# Patient Record
Sex: FEMALE | Race: WHITE | NOT HISPANIC OR LATINO | Employment: FULL TIME | URBAN - NONMETROPOLITAN AREA
[De-identification: names, ages, dates, MRNs, and addresses within clinical notes are randomized per-mention and may not be internally consistent; named-entity substitution may affect disease eponyms.]

---

## 2022-07-01 ENCOUNTER — OFFICE VISIT (OUTPATIENT)
Dept: FAMILY MEDICINE CLINIC | Facility: CLINIC | Age: 27
End: 2022-07-01

## 2022-07-01 VITALS
DIASTOLIC BLOOD PRESSURE: 90 MMHG | BODY MASS INDEX: 45.96 KG/M2 | SYSTOLIC BLOOD PRESSURE: 112 MMHG | HEART RATE: 92 BPM | OXYGEN SATURATION: 98 % | HEIGHT: 59 IN | WEIGHT: 228 LBS

## 2022-07-01 DIAGNOSIS — H02.539 LID LAG: ICD-10-CM

## 2022-07-01 DIAGNOSIS — G43.009 MIGRAINE WITHOUT AURA AND WITHOUT STATUS MIGRAINOSUS, NOT INTRACTABLE: Primary | ICD-10-CM

## 2022-07-01 PROCEDURE — 99214 OFFICE O/P EST MOD 30 MIN: CPT | Performed by: PHYSICIAN ASSISTANT

## 2022-07-01 RX ORDER — RIZATRIPTAN BENZOATE 10 MG/1
10 TABLET ORAL
COMMUNITY
End: 2022-07-01 | Stop reason: SDUPTHER

## 2022-07-01 RX ORDER — HYDROXYZINE HYDROCHLORIDE 25 MG/1
12.5-25 TABLET, FILM COATED ORAL
COMMUNITY
End: 2022-07-01

## 2022-07-01 RX ORDER — PROPRANOLOL HCL 60 MG
60 CAPSULE, EXTENDED RELEASE 24HR ORAL DAILY
Qty: 30 CAPSULE | Refills: 1 | Status: SHIPPED | OUTPATIENT
Start: 2022-07-01 | End: 2022-08-12 | Stop reason: SDUPTHER

## 2022-07-01 RX ORDER — RIZATRIPTAN BENZOATE 10 MG/1
10 TABLET ORAL ONCE AS NEEDED
Qty: 12 TABLET | Refills: 10 | Status: SHIPPED | OUTPATIENT
Start: 2022-07-01 | End: 2023-01-04

## 2022-07-01 RX ORDER — CETIRIZINE HYDROCHLORIDE 10 MG/1
10 TABLET ORAL DAILY
COMMUNITY
End: 2022-08-12 | Stop reason: SDUPTHER

## 2022-07-01 RX ORDER — FAMOTIDINE 20 MG/1
20 TABLET, FILM COATED ORAL
COMMUNITY
End: 2022-08-12 | Stop reason: SDUPTHER

## 2022-07-01 NOTE — PROGRESS NOTES
"Chief Complaint  Migraine (Patient reports that she has a hx on migraines and would like to discuss. She states that she has lightheaded and dizziness )    Subjective        Nena Merida presents to [facility]  Patient reports today secondary to needing a work-up.  Patient reports she has been having increased migraines for the past 6 to 7 months.  Patient states she discontinued her birth control around 8 months ago.  Patient states she has been having 2-3 migraines a month.  Reports 5 weeks ago she had a migraine and afterwards she has began to have lid lag of the left eyelid.  Patient reports it is usual for her to have blurred vision with a migraine however she has never had lid lag.  Patient denies any recent eye exam.  Patient denies any weakness or difficulty speaking or other signs of stroke.    Migraine      Objective   Vital Signs:  /90 (BP Location: Left arm, Patient Position: Sitting, Cuff Size: Adult)   Pulse 92   Ht 149.9 cm (59\")   Wt 103 kg (228 lb)   SpO2 98%   BMI 46.05 kg/m²   Estimated body mass index is 46.05 kg/m² as calculated from the following:    Height as of this encounter: 149.9 cm (59\").    Weight as of this encounter: 103 kg (228 lb).          Physical Exam  Vitals and nursing note reviewed.   Constitutional:       General: She is not in acute distress.     Appearance: Normal appearance.   HENT:      Head: Normocephalic.      Right Ear: Hearing normal.      Left Ear: Hearing normal.   Eyes:      Pupils: Pupils are equal, round, and reactive to light.      Comments: Lid lag left upper eye lid   Cardiovascular:      Rate and Rhythm: Normal rate and regular rhythm.   Pulmonary:      Effort: Pulmonary effort is normal. No respiratory distress.   Skin:     General: Skin is warm and dry.   Neurological:      Mental Status: She is alert.   Psychiatric:         Mood and Affect: Mood normal.        Result Review :                Assessment and Plan "   Diagnoses and all orders for this visit:    1. Migraine without aura and without status migrainosus, not intractable (Primary)  Assessment & Plan:  Headaches are worsening and likely secondary to patient discs continue on birth control 8 months ago.  Patient will start propanolol for possible migraine prophylaxis.  Patient will continue rizatriptan.  Call if any problems arise and return to clinic in 4 to 6 weeks for follow-up.  Will refer patient to neurology as requested    Orders:  -     rizatriptan (MAXALT) 10 MG tablet; Take 1 tablet by mouth 1 (One) Time As Needed for Migraine for up to 1 dose.  Dispense: 12 tablet; Refill: 10  -     propranolol LA (Inderal LA) 60 MG 24 hr capsule; Take 1 capsule by mouth Daily. For migraine prevention  Dispense: 30 capsule; Refill: 1  -     Ambulatory Referral to Neurology  -     MRI Brain Without Contrast; Future    2. Lid lag  Assessment & Plan:  Advised patient to make an appointment with ophthalmologist for further work-up.  We will schedule patient for MRI.    Orders:  -     MRI Brain Without Contrast; Future           Follow Up   No follow-ups on file.  Patient was given instructions and counseling regarding her condition or for health maintenance advice. Please see specific information pulled into the AVS if appropriate.

## 2022-07-01 NOTE — ASSESSMENT & PLAN NOTE
Advised patient to make an appointment with ophthalmologist for further work-up.  We will schedule patient for MRI.

## 2022-08-12 ENCOUNTER — OFFICE VISIT (OUTPATIENT)
Dept: FAMILY MEDICINE CLINIC | Facility: CLINIC | Age: 27
End: 2022-08-12

## 2022-08-12 VITALS
WEIGHT: 230 LBS | HEIGHT: 59 IN | DIASTOLIC BLOOD PRESSURE: 80 MMHG | SYSTOLIC BLOOD PRESSURE: 128 MMHG | BODY MASS INDEX: 46.37 KG/M2

## 2022-08-12 DIAGNOSIS — Z00.00 GENERAL MEDICAL EXAM: Primary | ICD-10-CM

## 2022-08-12 DIAGNOSIS — J30.1 SEASONAL ALLERGIC RHINITIS DUE TO POLLEN: ICD-10-CM

## 2022-08-12 DIAGNOSIS — G43.009 MIGRAINE WITHOUT AURA AND WITHOUT STATUS MIGRAINOSUS, NOT INTRACTABLE: ICD-10-CM

## 2022-08-12 DIAGNOSIS — K21.9 GERD WITHOUT ESOPHAGITIS: ICD-10-CM

## 2022-08-12 DIAGNOSIS — G43.709 CHRONIC MIGRAINE WITHOUT AURA WITHOUT STATUS MIGRAINOSUS, NOT INTRACTABLE: ICD-10-CM

## 2022-08-12 PROCEDURE — 99395 PREV VISIT EST AGE 18-39: CPT | Performed by: FAMILY MEDICINE

## 2022-08-12 RX ORDER — CETIRIZINE HYDROCHLORIDE 10 MG/1
10 TABLET ORAL DAILY
Qty: 90 TABLET | Refills: 1 | Status: SHIPPED | OUTPATIENT
Start: 2022-08-12

## 2022-08-12 RX ORDER — PROPRANOLOL HCL 60 MG
60 CAPSULE, EXTENDED RELEASE 24HR ORAL DAILY
Qty: 90 CAPSULE | Refills: 1 | Status: SHIPPED | OUTPATIENT
Start: 2022-08-12 | End: 2023-01-04

## 2022-08-12 RX ORDER — FAMOTIDINE 20 MG/1
20 TABLET, FILM COATED ORAL 2 TIMES DAILY
Qty: 180 TABLET | Refills: 1 | Status: SHIPPED | OUTPATIENT
Start: 2022-08-12 | End: 2023-02-08

## 2022-08-12 NOTE — PROGRESS NOTES
"Chief Complaint  Migraine (Follow-up on propranolol.) and Annual Exam    Subjective    History of Present Illness:  Nena Merida is a 27 y.o. female who presents today for checkup visit and to follow-up on recent treatment with Inderal LA for migraine prevention.    Doing great with low-dose Inderal LA.  Using Maxalt infrequently.    Allergies stable with Zyrtec and Flonase as needed.  We did discuss she can add in Zaditor drops over-the-counter    Last fasting blood work was in 2020 and she will consider getting that up-to-date but wants to hold off at this time.    Due for COVID booster and plans to get that up-to-date soon.    Pap smear is past due and she is scheduled in November with gynecology to get this up-to-date        Objective   Vital Signs:   /80 (BP Location: Left arm, Patient Position: Sitting, Cuff Size: Large Adult)   Ht 149.9 cm (59\")   Wt 104 kg (230 lb)   BMI 46.45 kg/m²     Review of Systems   Constitutional: Negative for appetite change, chills and fever.   HENT: Negative for hearing loss.    Eyes: Negative for blurred vision.   Respiratory: Negative for chest tightness.    Cardiovascular: Negative for chest pain.   Gastrointestinal: Negative for abdominal pain.   Musculoskeletal: Negative for gait problem.   Skin: Negative for rash.   Psychiatric/Behavioral: Negative for depressed mood.       Past History:  Medical History: has a past medical history of Anxiety, Common migraine, and Esophageal reflux.   Surgical History: has a past surgical history that includes [place] tooth extraction.   Family History: family history includes Cancer in her mother; Diabetes in an other family member; Hypertension in her father, mother, and another family member; Migraines in her mother, sister, and another family member.   Social History: reports that she has never smoked. She has never used smokeless tobacco. Drug use questions deferred to the physician. She reports that she does not drink " alcohol.      Current Outpatient Medications:   •  cetirizine (zyrTEC) 10 MG tablet, Take 1 tablet by mouth Daily., Disp: 90 tablet, Rfl: 1  •  famotidine (PEPCID) 20 MG tablet, Take 1 tablet by mouth 2 (Two) Times a Day., Disp: 180 tablet, Rfl: 1  •  propranolol LA (Inderal LA) 60 MG 24 hr capsule, Take 1 capsule by mouth Daily. For migraine prevention, Disp: 90 capsule, Rfl: 1  •  rizatriptan (MAXALT) 10 MG tablet, Take 1 tablet by mouth 1 (One) Time As Needed for Migraine for up to 1 dose., Disp: 12 tablet, Rfl: 10    Allergies: Sulfa antibiotics    Physical Exam  Constitutional:       Appearance: She is obese.   HENT:      Head: Normocephalic.      Right Ear: External ear normal.      Left Ear: External ear normal.      Nose: Nose normal.   Eyes:      Pupils: Pupils are equal, round, and reactive to light.   Cardiovascular:      Rate and Rhythm: Normal rate and regular rhythm.      Heart sounds: Normal heart sounds.   Pulmonary:      Effort: Pulmonary effort is normal.      Breath sounds: Normal breath sounds.   Musculoskeletal:         General: Normal range of motion.      Cervical back: Normal range of motion.   Skin:     General: Skin is warm and dry.   Neurological:      General: No focal deficit present.      Mental Status: She is alert.   Psychiatric:         Mood and Affect: Mood normal.         Behavior: Behavior normal.         Thought Content: Thought content normal.          Result Review                   Assessment and Plan  Diagnoses and all orders for this visit:    1. General medical exam (Primary)  Assessment & Plan:  Reviewed health maintenance and screening along with vaccinations.    Pap smear scheduled in November.    Encouraged COVID booster.    Encourage consideration for repeat fasting labs      2. Seasonal allergic rhinitis due to pollen  Assessment & Plan:  Continue with Zyrtec and Flonase    Orders:  -     cetirizine (zyrTEC) 10 MG tablet; Take 1 tablet by mouth Daily.  Dispense: 90  tablet; Refill: 1    3. Chronic migraine without aura without status migrainosus, not intractable  Assessment & Plan:  Headaches are improving with treatment.  Continue current treatment regimen.          4. Migraine without aura and without status migrainosus, not intractable  -     propranolol LA (Inderal LA) 60 MG 24 hr capsule; Take 1 capsule by mouth Daily. For migraine prevention  Dispense: 90 capsule; Refill: 1    5. GERD without esophagitis  Assessment & Plan:  Controlled with Pepcid    Orders:  -     famotidine (PEPCID) 20 MG tablet; Take 1 tablet by mouth 2 (Two) Times a Day.  Dispense: 180 tablet; Refill: 1      Class 3 Severe Obesity (BMI >=40). Obesity-related health conditions include the following: GERD. Obesity is improving with treatment. BMI is is above average; BMI management plan is completed. We discussed portion control and increasing exercise.          Follow Up  Return in about 6 months (around 2/12/2023) for Med recheck.    Jose Angel Ortiz MD

## 2022-08-12 NOTE — ASSESSMENT & PLAN NOTE
Reviewed health maintenance and screening along with vaccinations.    Pap smear scheduled in November.    Encouraged COVID booster.    Encourage consideration for repeat fasting labs

## 2022-09-14 ENCOUNTER — LAB (OUTPATIENT)
Dept: LAB | Facility: HOSPITAL | Age: 27
End: 2022-09-14

## 2022-09-14 ENCOUNTER — OFFICE VISIT (OUTPATIENT)
Dept: NEUROLOGY | Facility: CLINIC | Age: 27
End: 2022-09-14

## 2022-09-14 VITALS
BODY MASS INDEX: 45.96 KG/M2 | HEIGHT: 59 IN | OXYGEN SATURATION: 98 % | SYSTOLIC BLOOD PRESSURE: 132 MMHG | HEART RATE: 70 BPM | DIASTOLIC BLOOD PRESSURE: 82 MMHG | WEIGHT: 228 LBS

## 2022-09-14 DIAGNOSIS — G43.709 CHRONIC MIGRAINE WITHOUT AURA WITHOUT STATUS MIGRAINOSUS, NOT INTRACTABLE: ICD-10-CM

## 2022-09-14 DIAGNOSIS — H02.402 PTOSIS, LEFT EYELID: Primary | ICD-10-CM

## 2022-09-14 DIAGNOSIS — H53.453 DECREASED PERIPHERAL VISION OF BOTH EYES: ICD-10-CM

## 2022-09-14 DIAGNOSIS — H02.402 PTOSIS, LEFT EYELID: ICD-10-CM

## 2022-09-14 DIAGNOSIS — R40.0 DAYTIME SLEEPINESS: ICD-10-CM

## 2022-09-14 DIAGNOSIS — R06.83 SNORING: ICD-10-CM

## 2022-09-14 PROBLEM — F32.A ANXIETY AND DEPRESSION: Status: ACTIVE | Noted: 2021-12-15

## 2022-09-14 PROBLEM — F41.9 ANXIETY AND DEPRESSION: Status: ACTIVE | Noted: 2021-12-15

## 2022-09-14 PROBLEM — J30.2 SEASONAL ALLERGIES: Status: ACTIVE | Noted: 2019-11-06

## 2022-09-14 PROBLEM — G43.909 MIGRAINES: Status: ACTIVE | Noted: 2021-12-15

## 2022-09-14 LAB
ALBUMIN SERPL-MCNC: 4.5 G/DL (ref 3.5–5.2)
ALBUMIN/GLOB SERPL: 1.7 G/DL
ALP SERPL-CCNC: 68 U/L (ref 39–117)
ALT SERPL W P-5'-P-CCNC: 63 U/L (ref 1–33)
ANION GAP SERPL CALCULATED.3IONS-SCNC: 12.7 MMOL/L (ref 5–15)
AST SERPL-CCNC: 31 U/L (ref 1–32)
BILIRUB SERPL-MCNC: 0.3 MG/DL (ref 0–1.2)
BUN SERPL-MCNC: 12 MG/DL (ref 6–20)
BUN/CREAT SERPL: 12.5 (ref 7–25)
CALCIUM SPEC-SCNC: 9.8 MG/DL (ref 8.6–10.5)
CHLORIDE SERPL-SCNC: 103 MMOL/L (ref 98–107)
CO2 SERPL-SCNC: 24.3 MMOL/L (ref 22–29)
CREAT SERPL-MCNC: 0.96 MG/DL (ref 0.57–1)
EGFRCR SERPLBLD CKD-EPI 2021: 82.8 ML/MIN/1.73
GLOBULIN UR ELPH-MCNC: 2.7 GM/DL
GLUCOSE SERPL-MCNC: 89 MG/DL (ref 65–99)
POTASSIUM SERPL-SCNC: 4.3 MMOL/L (ref 3.5–5.2)
PROT SERPL-MCNC: 7.2 G/DL (ref 6–8.5)
SODIUM SERPL-SCNC: 140 MMOL/L (ref 136–145)
T4 FREE SERPL-MCNC: 1.28 NG/DL (ref 0.93–1.7)
TSH SERPL DL<=0.05 MIU/L-ACNC: 1.59 UIU/ML (ref 0.27–4.2)

## 2022-09-14 PROCEDURE — 99204 OFFICE O/P NEW MOD 45 MIN: CPT | Performed by: NURSE PRACTITIONER

## 2022-09-14 PROCEDURE — 83519 RIA NONANTIBODY: CPT

## 2022-09-14 PROCEDURE — 84439 ASSAY OF FREE THYROXINE: CPT

## 2022-09-14 PROCEDURE — 80050 GENERAL HEALTH PANEL: CPT

## 2022-09-14 PROCEDURE — 36415 COLL VENOUS BLD VENIPUNCTURE: CPT

## 2022-09-14 RX ORDER — PRENATAL VIT NO.126/IRON/FOLIC 28MG-0.8MG
1 TABLET ORAL AS NEEDED
COMMUNITY

## 2022-09-14 NOTE — PROGRESS NOTES
Subjective:     Patient ID: Nena Merida is a 27 y.o. female.    CC:   Chief Complaint   Patient presents with   • Migraine       HPI:   History of Present Illness   Today 9/14/2022-  This is a very pleasant 27-year-old female who presents to establish care with neurology for migraine headaches, which started in 2005. She feels that her symptoms are worsening. Triggers have been stress, anxiety, quiet after loud noises, sometimes heat and coolness have helped, medications, and sleeping. She has found that lights, noise, anxiety, and stress worsen her symptoms at times. She has had some slurred speech and drooping of her left eyelid associated with severe migraines. She has not necessarily noticed any pattern in her symptoms. She has been told to take Tylenol. She is currently on propranolol LA 60 mg daily for migraine prevention and has been prescribed rizatriptan to take at onset of migraines.    Today, the patient reports a family history of migraines in her mother, sister, and grandmother.    She started taking propranolol LA 60 mg daily approximately 8 weeks ago, and she feels this has helped for the most part. The migraines have not been as severe since starting this medication. She denies any side effects from the propranolol currently, but it did take some adjusting while starting due to some tiredness.    The patient is prescribed rizatriptan to take as needed, and she states this is helpful. She occasionally has to take the second dose. Over the past 6 months, she has had 16-17 headache days per month with 9 days being migraines. Her migraines typically last approximately 3 days, and she has 3 per month. Her other headaches are in addition to the migraines. When she has a migraine, the pain is located in the neck, and sometimes it is more on the left side & can be across her forehead starting with the left side and moving to the right side. She describes the pain as throbbing and moderate to severe. She  "has nausea, vomiting, photophobia, and phonophobia. The patient reports 1 episode of blurry vision and disorientation in 04/2022 prior to the worst migraine she has ever had. She has not had a dilated eye exam in years, but she did see her optometrist, who diagnosed her with ptosis of the left eyelid & did take detailed pictures of the back of her eye and did not see swelling or other problems. The ptosis is intermittent, and it worsens with headaches. She denies any numbness, tingling, or weakness. She denies any double vision, shortness of breath, dysphagia, or weakness in the upper extremities. The patient denies any difficulty moving the eyes.    The patient reports that the worst migraine she has ever had was in early 05/2022, which lasted 4 days. She did not go to the hospital. She continued working through this migraine. She has not had any imaging of the brain. The patient reports that she is claustrophobic, but she has never had an MRI. The patient states she was on a migraine medication that she had to lay down after taking. She took it as needed. It was likely sumatriptan. She did not like the way this made her feel.    The patient denies any neck pain. She reports that she sleeps \" funny \" at times. Her  has told her that she snores a little, but it is not heavy. She has not had a sleep study. The patient reports that she is usually pretty sleepy when she wakes up in the morning. It does not interfere with her day. She does not have any problems going to sleep.     The patient is planning to have children within the next year. She is trying to lose weight first.    She states that she loses her blalance when she is in the shower washing her hair. The patient does not feel dizzy when this happens.     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    The following portions of the patient's history were reviewed and updated as appropriate: allergies, current medications, past family history, past medical history, past social history, past surgical history and problem list.    Past Medical History:   Diagnosis Date   • Anxiety    • Cluster headache 2005   • Common migraine    • Depression    • Esophageal reflux    • Headache, tension-type 2005       Past Surgical History:   Procedure Laterality Date   • [place]IS[place] TOOTH EXTRACTION         Social History     Socioeconomic History   • Marital status:    Tobacco Use   • Smoking status: Never Smoker   • Smokeless tobacco: Never Used   Vaping Use   • Vaping Use: Never used   Substance and Sexual Activity   • Alcohol use: Yes     Alcohol/week: 2.0 - 3.0 standard drinks     Types: 2 - 3 Drinks containing 0.5 oz of alcohol per week   • Drug use: Never   • Sexual activity: Yes     Partners: Male     Comment: Last BC pull taken Oct 2021       Family History   Problem Relation Age of Onset   • Migraines Mother    • Cancer Mother    • Hypertension Mother    • Hypertension Father    • Migraines Father    • Migraines Sister    • Diabetes Other    • Hypertension Other    • Migraines Other           Current Outpatient Medications:   •  cetirizine (zyrTEC) 10 MG tablet, Take 1 tablet by mouth Daily., Disp: 90 tablet, Rfl: 1  •  famotidine (PEPCID) 20 MG tablet, Take 1 tablet by mouth 2 (Two) Times a Day., Disp: 180 tablet, Rfl: 1  •  prenatal vitamin (prenatal, CLASSIC, vitamin) tablet, Take 1 tablet by mouth As Needed., Disp: , Rfl:   •  propranolol LA (Inderal LA) 60 MG 24 hr capsule, Take 1 capsule by mouth Daily. For migraine prevention, Disp: 90 capsule, Rfl: 1  •  rizatriptan (MAXALT) 10 MG tablet, Take 1 tablet by mouth 1 (One) Time As Needed for Migraine for up to 1 dose., Disp: 12 tablet, Rfl: 10     Review of Systems   Constitutional: Negative for chills, fatigue, fever and  "unexpected weight change.   HENT: Negative for ear pain, hearing loss, nosebleeds, rhinorrhea and sore throat.    Eyes: Positive for visual disturbance. Negative for photophobia, pain, discharge and itching.   Respiratory: Negative for cough, chest tightness, shortness of breath and wheezing.    Cardiovascular: Negative for chest pain, palpitations and leg swelling.   Gastrointestinal: Positive for nausea and vomiting. Negative for abdominal pain, blood in stool, constipation and diarrhea.   Genitourinary: Negative for dysuria, frequency, hematuria and urgency.   Musculoskeletal: Negative for arthralgias, back pain, gait problem, joint swelling, myalgias, neck pain and neck stiffness.   Skin: Negative for rash and wound.   Allergic/Immunologic: Negative for environmental allergies and food allergies.   Neurological: Positive for headaches. Negative for dizziness, tremors, seizures, syncope, speech difficulty, weakness, light-headedness and numbness.   Hematological: Negative for adenopathy. Does not bruise/bleed easily.   Psychiatric/Behavioral: Positive for dysphoric mood. Negative for agitation, confusion, decreased concentration, hallucinations, sleep disturbance and suicidal ideas. The patient is nervous/anxious.    All other systems reviewed and are negative.       Objective:  /82   Pulse 70   Ht 149.9 cm (59\")   Wt 103 kg (228 lb)   SpO2 98%   BMI 46.05 kg/m²     Neurologic Exam     Mental Status   Oriented to person, place, and time.   Attention: normal. Concentration: normal.   Speech: speech is normal   Level of consciousness: alert  Knowledge: good.   Normal comprehension.     Cranial Nerves     CN II   Visual acuity: decreased  Right visual field deficit: upper temporal (slight decreased peripheral vision equal and bilateral) quadrant(s)  Left visual field deficit: upper temporal quadrant(s)    CN III, IV, VI   Pupils are equal, round, and reactive to light.  Extraocular motions are normal. "   CN III: no CN III palsy  CN VI: no CN VI palsy  Nystagmus: none   Diplopia: none  Ophthalmoparesis: none  Upgaze: normal  Downgaze: normal    CN V   Facial sensation intact.     CN VII   Facial expression full, symmetric.   Left facial weakness: left eyelid ptosis marked, otherwise face symmetric.    CN VIII   CN VIII normal.     CN IX, X   CN IX normal.   CN X normal.     CN XI   CN XI normal.     CN XII   CN XII normal.     Motor Exam   Muscle bulk: normal  Overall muscle tone: normal    Strength   Strength 5/5 throughout.     Sensory Exam   Light touch normal.   Vibration normal.   Proprioception normal.   Pinprick normal.     Gait, Coordination, and Reflexes     Gait  Gait: normal    Coordination   Romberg: negative  Finger to nose coordination: normal  Heel to shin coordination: normal  Tandem walking coordination: normal    Tremor   Resting tremor: absent  Intention tremor: absent  Action tremor: absent    Reflexes   Reflexes 2+ except as noted.   Right : 2+  Left : 2+  Right plantar: normal  Left plantar: normal  Right ankle clonus: absent  Left ankle clonus: absent      Physical Exam  Constitutional:       Appearance: Normal appearance. She is obese.      Comments: BMI 46   Eyes:      Extraocular Movements: Extraocular movements intact and EOM normal.      Conjunctiva/sclera: Conjunctivae normal.      Pupils: Pupils are equal, round, and reactive to light.      Right eye: Pupil is not sluggish.      Left eye: Pupil is not sluggish.      Funduscopic exam:     Right eye: Red reflex present.         Left eye: Red reflex present.    Cardiovascular:      Rate and Rhythm: Normal rate and regular rhythm.      Heart sounds: Normal heart sounds, S1 normal and S2 normal.   Musculoskeletal:      Cervical back: Normal range of motion.   Neurological:      Mental Status: She is alert and oriented to person, place, and time.      Coordination: Finger-Nose-Finger Test, Heel to Shin Test and Romberg Test normal.       Gait: Gait is intact. Tandem walk normal.      Deep Tendon Reflexes: Strength normal.   Psychiatric:         Mood and Affect: Mood and affect normal.         Speech: Speech normal.         Behavior: Behavior normal.         Thought Content: Thought content normal.         Cognition and Memory: Cognition and memory normal.         Judgment: Judgment normal.         Assessment/Plan:       Diagnoses and all orders for this visit:    1. Ptosis, left eyelid (Primary)  -     MRI Brain With & Without Contrast; Future  -     Musk Antibody; Future  -     Acetylcholine Receptor Antibody Panel; Future  -     Acetylcholine Receptor, Binding; Future  -     Acetylcholine Receptor, Blocking; Future  -     Ambulatory Referral to Ophthalmology    2. Chronic migraine without aura without status migrainosus, not intractable  -     MRI Brain With & Without Contrast; Future  -     TSH; Future  -     T4, free; Future  -     Comprehensive Metabolic Panel; Future  -     CBC & Differential; Future    3. Snoring  -     Ambulatory Referral to Sleep Medicine    4. Daytime sleepiness  -     Ambulatory Referral to Sleep Medicine    5. Decreased peripheral vision of both eyes  -     Ambulatory Referral to Ophthalmology           She currently has chronic migraines. Previously tried Imitrex and over the counter medications without improvement. For now, she is going to continue the propranolol LA at 60 mg daily as initially this made her quite tired, but she has finally adjusted. The rizatriptan is working well for her to take as needed for her migraines, and they seem to be improving over the last 8 weeks.    She does have significant ptosis and some decreased peripheral vision on exam. I am referring her to ophthalmology for further evaluation. She is going to upload her optometrist evaluation. I do recommend visual field testing, additional labs to rule out any chance of myasthenia gravis or ocular myasthenia.    She is also going to start  magnesium oxide 400 mg a day. We did discuss safety and monitoring of medications if she were to become pregnant. She will call us with any additional questions or concerns prior to follow-up.    She is going to follow up in 4 months for reevaluation of migraines or sooner if needed.    Reviewed medications, potential side effects and signs and symptoms to report. Discussed risk versus benefits of treatment plan with patient and/or family-including medications, labs and radiology that may be ordered. Addressed questions and concerns during visit. Patient and/or family verbalized understanding and agree with plan.    AS THE PROVIDER, I PERSONALLY WORE PPE DURING ENTIRE FACE TO FACE ENCOUNTER IN CLINIC WITH THE PATIENT. PATIENT ALSO WORE PPE DURING ENTIRE FACE TO FACE ENCOUNTER EXCEPT FOR A MAX OF 30 SECONDS DURING NEUROLOGICAL EVALUATION OF CRANIAL NERVES AND THEN MASK WAS PLACED BACK OVER PATIENT FACE FOR REMAINDER OF VISIT. I WASHED MY HANDS BEFORE AND AFTER VISIT.    During this visit the following were done:  Labs Reviewed [x]    Labs Ordered [x]    Radiology Reports Reviewed []    Radiology Ordered [x]    PCP Records Reviewed [x]    Referring Provider Records Reviewed [x]    ER Records Reviewed []    Hospital Records Reviewed []    History Obtained From Family []    Radiology Images Reviewed []    Other Reviewed [x]    Records Requested []      Transcribed from ambient dictation for MARQUEZ Obando by HEATH WHARTON.  09/14/22   17:34 EDT    Patient verbalized consent to the visit recording.  I have personally performed the services described in this document as transcribed by the above individual, and it is both accurate and complete.  MARQUEZ Obando  9/14/2022  21:47 EDT

## 2022-09-15 ENCOUNTER — TELEPHONE (OUTPATIENT)
Dept: NEUROLOGY | Facility: CLINIC | Age: 27
End: 2022-09-15

## 2022-09-15 LAB
BASOPHILS # BLD AUTO: 0.04 10*3/MM3 (ref 0–0.2)
BASOPHILS NFR BLD AUTO: 0.5 % (ref 0–1.5)
DEPRECATED RDW RBC AUTO: 40.2 FL (ref 37–54)
EOSINOPHIL # BLD AUTO: 0.07 10*3/MM3 (ref 0–0.4)
EOSINOPHIL NFR BLD AUTO: 0.9 % (ref 0.3–6.2)
ERYTHROCYTE [DISTWIDTH] IN BLOOD BY AUTOMATED COUNT: 12.3 % (ref 12.3–15.4)
HCT VFR BLD AUTO: 39.5 % (ref 34–46.6)
HGB BLD-MCNC: 13.6 G/DL (ref 12–15.9)
IMM GRANULOCYTES # BLD AUTO: 0.02 10*3/MM3 (ref 0–0.05)
IMM GRANULOCYTES NFR BLD AUTO: 0.3 % (ref 0–0.5)
LYMPHOCYTES # BLD AUTO: 1.97 10*3/MM3 (ref 0.7–3.1)
LYMPHOCYTES NFR BLD AUTO: 25.8 % (ref 19.6–45.3)
MCH RBC QN AUTO: 30.3 PG (ref 26.6–33)
MCHC RBC AUTO-ENTMCNC: 34.4 G/DL (ref 31.5–35.7)
MCV RBC AUTO: 88 FL (ref 79–97)
MONOCYTES # BLD AUTO: 0.48 10*3/MM3 (ref 0.1–0.9)
MONOCYTES NFR BLD AUTO: 6.3 % (ref 5–12)
NEUTROPHILS NFR BLD AUTO: 5.07 10*3/MM3 (ref 1.7–7)
NEUTROPHILS NFR BLD AUTO: 66.2 % (ref 42.7–76)
NRBC BLD AUTO-RTO: 0 /100 WBC (ref 0–0.2)
PLATELET # BLD AUTO: 355 10*3/MM3 (ref 140–450)
PMV BLD AUTO: 9.7 FL (ref 6–12)
RBC # BLD AUTO: 4.49 10*6/MM3 (ref 3.77–5.28)
WBC NRBC COR # BLD: 7.65 10*3/MM3 (ref 3.4–10.8)

## 2022-09-15 NOTE — TELEPHONE ENCOUNTER
----- Message from MARQUEZ Obando sent at 9/15/2022  8:30 AM EDT -----  Please notify Nena that her blood counts look excellent, her thyroid levels look perfect, her liver and kidney function overall look normal with the exception of one of her labs the ALT which has to do with liver is elevated.  I would recommend that she have her labs rechecked in 3 to 6 months with her primary care provider additional specialty labs are pending and we will find her of those results once available.  Thanks, MARQUEZ Veliz.

## 2022-09-15 NOTE — PROGRESS NOTES
Please notify Nena that her blood counts look excellent, her thyroid levels look perfect, her liver and kidney function overall look normal with the exception of one of her labs the ALT which has to do with liver is elevated.  I would recommend that she have her labs rechecked in 3 to 6 months with her primary care provider additional specialty labs are pending and we will find her of those results once available.  Thanks, MARQUEZ Veliz.

## 2022-09-20 LAB
ACHR BIND AB SER-SCNC: <0.03 NMOL/L (ref 0–0.24)
ACHR BLOCK AB SER-ACNC: 18 % (ref 0–25)
ACHR RECEPTOR MODULATING AB: 0 % (ref 0–45)

## 2022-09-21 ENCOUNTER — TELEPHONE (OUTPATIENT)
Dept: NEUROLOGY | Facility: CLINIC | Age: 27
End: 2022-09-21

## 2022-09-21 NOTE — TELEPHONE ENCOUNTER
----- Message from MARQUEZ Obando sent at 9/21/2022  8:37 AM EDT -----  Labs to evaluate for myasthenia gravis are normal and negative.,  MARQUEZ Veliz.

## 2022-09-22 ENCOUNTER — PATIENT MESSAGE (OUTPATIENT)
Dept: FAMILY MEDICINE CLINIC | Facility: CLINIC | Age: 27
End: 2022-09-22

## 2022-09-23 NOTE — TELEPHONE ENCOUNTER
From: Nena Merida  To: Jose Angel Ortiz MD  Sent: 9/22/2022 3:38 PM EDT  Subject: Request    Hey Dr. Ortiz, last time I was in I told you I had started a new job and it was working out great. Well, it turns out I am sitting A LOT. It’s required that I get a physician note to get a stand up desk for my office. Would you be able to write a note for me to get one? It’s very difficult to sit all day or to type standing up on a regular desk when I’m used to moving about all day.     Thank you!

## 2022-09-28 LAB — MUSK AB SER IA-ACNC: <1 U/ML

## 2022-10-05 ENCOUNTER — PATIENT MESSAGE (OUTPATIENT)
Dept: FAMILY MEDICINE CLINIC | Facility: CLINIC | Age: 27
End: 2022-10-05

## 2022-10-06 RX ORDER — ESCITALOPRAM OXALATE 10 MG/1
10 TABLET ORAL DAILY
Qty: 30 TABLET | Refills: 1 | Status: SHIPPED | OUTPATIENT
Start: 2022-10-06 | End: 2023-01-03

## 2022-10-06 NOTE — TELEPHONE ENCOUNTER
From: Nena Merida  To: Jose Angel Ortiz MD  Sent: 10/5/2022 2:44 PM EDT  Subject: Lexapro    Dr. Ortiz… I know I said I was feeling okay in July about not being on the Lexapro anymore. Now that it’s October, I am feeling the consequences. Would we be able to re-instate that prescription? If we need to make another appointment I can, my next one with you is in February.

## 2022-10-25 ENCOUNTER — HOSPITAL ENCOUNTER (OUTPATIENT)
Dept: MRI IMAGING | Facility: HOSPITAL | Age: 27
Discharge: HOME | End: 2022-10-25
Admitting: NURSE PRACTITIONER

## 2022-10-25 DIAGNOSIS — G43.709 CHRONIC MIGRAINE WITHOUT AURA WITHOUT STATUS MIGRAINOSUS, NOT INTRACTABLE: ICD-10-CM

## 2022-10-25 DIAGNOSIS — H02.402 PTOSIS, LEFT EYELID: ICD-10-CM

## 2022-10-25 PROCEDURE — A9577 INJ MULTIHANCE: HCPCS | Performed by: NURSE PRACTITIONER

## 2022-10-25 PROCEDURE — 70553 MRI BRAIN STEM W/O & W/DYE: CPT

## 2022-10-25 PROCEDURE — 0 GADOBENATE DIMEGLUMINE 529 MG/ML SOLUTION: Performed by: NURSE PRACTITIONER

## 2022-10-25 RX ADMIN — GADOBENATE DIMEGLUMINE 20 ML: 529 INJECTION, SOLUTION INTRAVENOUS at 15:38

## 2022-10-27 ENCOUNTER — TELEPHONE (OUTPATIENT)
Dept: NEUROLOGY | Facility: CLINIC | Age: 27
End: 2022-10-27

## 2022-10-27 NOTE — PROGRESS NOTES
Please notify Nena that the MRI of her brain with and without contrast is within normal limits.  We will follow-up as scheduled in clinic.  Thanks, MARQUEZ Veliz.

## 2022-10-27 NOTE — TELEPHONE ENCOUNTER
----- Message from MARQUEZ Obando sent at 10/27/2022 10:28 AM EDT -----  Please notify Nena that the MRI of her brain with and without contrast is within normal limits.  We will follow-up as scheduled in clinic.  Thanks, MARQUEZ Veliz.

## 2022-12-01 ENCOUNTER — TELEPHONE (OUTPATIENT)
Dept: NEUROLOGY | Facility: CLINIC | Age: 27
End: 2022-12-01

## 2022-12-01 NOTE — TELEPHONE ENCOUNTER
Caller: IVON    Relationship: W/ DR. ARROYO'S OFFICE ([facility])    Best call back number: [phone]    What form or medical record are you requesting: MRI BRAIN WWO CONTRAST COMPLETED ON 10/25/22    How would you like to receive the form or medical records (pick-up, mail, fax): FAX  If fax, what is the fax number: [phone]    Timeframe paperwork needed: ASAP    Additional notes: PT'S OPHTHALMOLOGY OFFICE HAS REQUESTED MRI RESULTS BE FAXED FOR DR. ARROYO TO REVIEW.    PLEASE REVIEW AND ADVISE.

## 2022-12-08 ENCOUNTER — PRIOR AUTHORIZATION (OUTPATIENT)
Dept: NEUROLOGY | Facility: CLINIC | Age: 27
End: 2022-12-08

## 2023-01-03 RX ORDER — ESCITALOPRAM OXALATE 10 MG/1
TABLET ORAL
Qty: 30 TABLET | Refills: 1 | Status: SHIPPED | OUTPATIENT
Start: 2023-01-03

## 2023-01-04 ENCOUNTER — OFFICE VISIT (OUTPATIENT)
Dept: NEUROLOGY | Facility: CLINIC | Age: 28
End: 2023-01-04
Payer: COMMERCIAL

## 2023-01-04 VITALS
HEART RATE: 70 BPM | SYSTOLIC BLOOD PRESSURE: 112 MMHG | DIASTOLIC BLOOD PRESSURE: 76 MMHG | BODY MASS INDEX: 43.83 KG/M2 | OXYGEN SATURATION: 99 % | WEIGHT: 217 LBS

## 2023-01-04 DIAGNOSIS — H02.402 PTOSIS, LEFT EYELID: ICD-10-CM

## 2023-01-04 DIAGNOSIS — G43.709 CHRONIC MIGRAINE WITHOUT AURA WITHOUT STATUS MIGRAINOSUS, NOT INTRACTABLE: Primary | ICD-10-CM

## 2023-01-04 PROCEDURE — 99214 OFFICE O/P EST MOD 30 MIN: CPT | Performed by: NURSE PRACTITIONER

## 2023-01-04 RX ORDER — RIZATRIPTAN BENZOATE 10 MG/1
10 TABLET ORAL ONCE AS NEEDED
Qty: 36 TABLET | Refills: 3 | Status: SHIPPED | OUTPATIENT
Start: 2023-01-04

## 2023-01-04 RX ORDER — PROPRANOLOL HCL 60 MG
60 CAPSULE, EXTENDED RELEASE 24HR ORAL DAILY
Qty: 90 CAPSULE | Refills: 3 | Status: SHIPPED | OUTPATIENT
Start: 2023-01-04

## 2023-01-04 NOTE — PROGRESS NOTES
Subjective:     Patient ID: Nena Merida is a 27 y.o. female.    CC:   Chief Complaint   Patient presents with   • ptosis, left eyelid       HPI:   History of Present Illness   Today 1/4/2023-  This is a pleasant 27-year-old female who presents for 3-month neurology follow-up on migraine headaches, which began in 2005. She was last seen in the clinic on 09/14/2022. At that time, she continued on propranolol LA 60 mg daily. Rizatriptan was working well for her. At that time, we also started her on some magnesium oxide 400 mg daily. At her last visit, she was having some left eyelid ptosis, and so we did complete additional work-up with MRI of the brain with and without contrast, and this was completed on 10/25/2022, and this is a normal MRI of the brain with no acute abnormalities and no abnormal contrast enhancement per my personal review, as well as radiology report, which I agree with. We recommended PCP recheck liver enzymes in 3 months. She is here for follow-up and reevaluation of her migraine. She also contacted us reporting that she was having some worsening migraines. We also recommended she get dilated eye exam completed. She has been evaluated by [facility], and they felt that the ptosis of her eyelid was likely a mechanical ptosis. She does follow with Dr. Robertson.    She reports that Dr. Robertson told her that it could have been a \"complicated\" migraine. She states that Dr. Robertson discussed surgery. She reports improvement since starting propranolol. She states that she has had a small migraine. She denies having daily headaches. She states that she did use the rizatriptan one time, and it seemed to stop her symptoms quickly. She states that it makes her symptoms more manageable. She states that her regular migraines last 3 days, but her most recent migraine only lasted for 1 day. She states that she did take a second dose of rizatriptan. She notes that her symptoms were moderate pain,  light and sound sensitivity and throbbing pain on the left side. She denies any nausea or vomiting.     She states that she has not taken Lexapro. She states that she is taking propranolol at night. She is scheduled for a sleep study on 01/18/2023. She adds that both of her parents have a history of sleep apnea.    She just recently started as a  for a long-term care facility. She states that she did a 20-hour shift yesterday. She reports that she is very tired today.     Prior Neuro history and workup:  Migraine headaches, which started in 2005. Previously symptoms were worsening. Triggers have been stress, anxiety, quiet after loud noises, sometimes heat and coolness have helped, medications, and sleeping. She has found that lights, noise, anxiety, and stress worsen her symptoms at times. She has had some slurred speech and drooping of her left eyelid associated with severe migraines. She has not necessarily noticed any pattern in her symptoms. She has been told to take Tylenol. She is currently on propranolol LA 60 mg daily for migraine prevention and has been prescribed rizatriptan to take at onset of migraines.     Positive Family history of migraines in her mother, sister, and grandmother.     Prior to propranolol LA from March to September of 2022 she had 16-17 headache days per month with 9 days being migraines. Her migraines typically last approximately 3 days, and she has 3 per month. Her other headaches are in addition to the migraines. When she has a migraine, the pain is located in the neck, and sometimes it is more on the left side & can be across her forehead starting with the left side and moving to the right side. She describes the pain as throbbing and moderate to severe. She has nausea, vomiting, photophobia, and phonophobia. The patient reports 1 episode of blurry vision and disorientation in 04/2022 prior to the worst migraine she has ever had. She has not had a dilated eye  exam in years, but she did see her optometrist, who diagnosed her with ptosis of the left eyelid & did take detailed pictures of the back of her eye and did not see swelling or other problems. The ptosis is intermittent, and it worsens with headaches. She denies any numbness, tingling, or weakness. She denies any double vision, shortness of breath, dysphagia, or weakness in the upper extremities. The patient denies any difficulty moving the eyes. Previously tried sumatriptan and did not tolerate this.     The patient reports that the worst migraine she has ever had was in early 05/2022, which lasted 4 days. She did not go to the hospital. She continued working through this migraine.    The following portions of the patient's history were reviewed and updated as appropriate: allergies, current medications, past family history, past medical history, past social history, past surgical history and problem list.    Past Medical History:   Diagnosis Date   • Anxiety    • Cluster headache 2005   • Common migraine    • Depression    • Esophageal reflux    • Headache, tension-type 2005       Past Surgical History:   Procedure Laterality Date   • WISDOM TOOTH EXTRACTION         Social History     Socioeconomic History   • Marital status:    Tobacco Use   • Smoking status: Never   • Smokeless tobacco: Never   Vaping Use   • Vaping Use: Never used   Substance and Sexual Activity   • Alcohol use: Yes     Alcohol/week: 2.0 - 3.0 standard drinks     Types: 2 - 3 Drinks containing 0.5 oz of alcohol per week   • Drug use: Never   • Sexual activity: Yes     Partners: Male     Comment: Last BC pull taken Oct 2021       Family History   Problem Relation Age of Onset   • Migraines Mother    • Cancer Mother    • Hypertension Mother    • Hypertension Father    • Migraines Father    • Migraines Sister    • Diabetes Other    • Hypertension Other    • Migraines Other           Current Outpatient Medications:   •  cetirizine (zyrTEC)  10 MG tablet, Take 1 tablet by mouth Daily., Disp: 90 tablet, Rfl: 1  •  famotidine (PEPCID) 20 MG tablet, Take 1 tablet by mouth 2 (Two) Times a Day., Disp: 180 tablet, Rfl: 1  •  prenatal vitamin (prenatal, CLASSIC, vitamin) tablet, Take 1 tablet by mouth As Needed., Disp: , Rfl:   •  propranolol LA (Inderal LA) 60 MG 24 hr capsule, Take 1 capsule by mouth Daily. For migraine prevention, Disp: 90 capsule, Rfl: 3  •  rizatriptan (MAXALT) 10 MG tablet, Take 1 tablet by mouth 1 (One) Time As Needed for Migraine for up to 1 dose., Disp: 36 tablet, Rfl: 3  •  escitalopram (LEXAPRO) 10 MG tablet, TAKE 1 TABLET BY MOUTH EVERY DAY, Disp: 30 tablet, Rfl: 1     Review of Systems   Neurological: Positive for headaches.   All other systems reviewed and are negative.       Objective:  /76   Pulse 70   Wt 98.4 kg (217 lb)   SpO2 99%   BMI 43.83 kg/m²     Neurologic Exam     Mental Status   Oriented to person, place, and time.   Speech: speech is normal   Level of consciousness: alert    Cranial Nerves     CN II   Visual fields full to confrontation.     CN III, IV, VI   Pupils are equal, round, and reactive to light.  Extraocular motions are normal.   Ophthalmoparesis: none    CN V   Facial sensation intact.     CN VII   Right facial weakness: none  Left facial weakness: marked left eyelid ptosis covering 40% of pupil and visual field-marked, otherwise symmetric face.    CN VIII   CN VIII normal.     CN IX, X   CN IX normal.   CN X normal.     CN XI   CN XI normal.     CN XII   CN XII normal.     Motor Exam   Muscle bulk: normal  Overall muscle tone: normal    Strength   Strength 5/5 throughout.     Gait, Coordination, and Reflexes     Gait  Gait: normal    Coordination   Finger to nose coordination: normal    Tremor   Resting tremor: absent  Intention tremor: absent  Action tremor: absent    Reflexes   Right : 2+  Left : 2+      Physical Exam  Constitutional:       Appearance: Normal appearance.   Eyes:       Extraocular Movements: EOM normal.      Pupils: Pupils are equal, round, and reactive to light.     Neurological:      Mental Status: She is alert and oriented to person, place, and time.      Motor: Motor strength is normal.      Coordination: Finger-Nose-Finger Test normal.      Gait: Gait is intact.   Psychiatric:         Mood and Affect: Mood and affect normal.         Speech: Speech normal.     Labs on 09/14/2022, musculoskeletal negative. Acetylcholine receptor antibody panel was negative. TSH and free T4 levels were normal. Comprehensive metabolic panel showed ALT 63 and otherwise within normal limits, and CBC with differential was also within normal limits.    Assessment/Plan:       Diagnoses and all orders for this visit:    1. Chronic migraine without aura without status migrainosus, not intractable (Primary)  -     propranolol LA (Inderal LA) 60 MG 24 hr capsule; Take 1 capsule by mouth Daily. For migraine prevention  Dispense: 90 capsule; Refill: 3  -     rizatriptan (MAXALT) 10 MG tablet; Take 1 tablet by mouth 1 (One) Time As Needed for Migraine for up to 1 dose.  Dispense: 36 tablet; Refill: 3    2. Ptosis, left eyelid         She is doing excellent from a neurological standpoint. Unfortunately, she continues to have persistent left eyelid ptosis. She is meeting with Dr. Robertson to consider repair since this does cover her pupil by about 40 percent at times. He feels that she likely had a complex migraine or migraine with aura that caused this. Since labs are unremarkable and neuroimaging is unremarkable, her migraines are very well controlled. She is going to continue her propranolol daily with the rizatriptan as needed. She has gone from having chronic migraines with 16 to 17 days a month with 9 being migraine days to over the past 3 months, having just 1 migraine day and headache day total, which is a significant improvement. She is also being evaluated by the sleep clinic coming up for evaluation of  sleep apnea and I encouraged her to keep this appointment. We reviewed her labs and her neuroimaging. I recommended she have her liver enzymes rechecked with PCP in the next 3 to 6 months. She verbalizes understanding.     We will complete a follow-up in 6 months by video visit or sooner if needed.    Reviewed medications, potential side effects and signs and symptoms to report. Discussed risk versus benefits of treatment plan with patient and/or family-including medications, labs and radiology that may be ordered. Addressed questions and concerns during visit. Patient and/or family verbalized understanding and agree with plan.    AS THE PROVIDER, I PERSONALLY WORE PPE DURING ENTIRE FACE TO FACE ENCOUNTER IN CLINIC WITH THE PATIENT. PATIENT ALSO WORE PPE DURING ENTIRE FACE TO FACE ENCOUNTER EXCEPT FOR A MAX OF 30 SECONDS DURING NEUROLOGICAL EVALUATION OF CRANIAL NERVES AND THEN MASK WAS PLACED BACK OVER PATIENT FACE FOR REMAINDER OF VISIT. I WASHED MY HANDS BEFORE AND AFTER VISIT.    During this visit the following were done:  Labs Reviewed [x]    Labs Ordered []    Radiology Reports Reviewed [x]    Radiology Ordered []    PCP Records Reviewed []    Referring Provider Records Reviewed []    ER Records Reviewed []    Hospital Records Reviewed []    History Obtained From Family []    Radiology Images Reviewed [x]    Other Reviewed [x] [facility] and Dr. Robertson notes   Records Requested []      Transcribed from ambient dictation for MARQUEZ Obando by Yuridia Sow.  01/04/23   13:30 EST    Patient or patient representative verbalized consent to the visit recording.  I have personally performed the services described in this document as transcribed by the above individual, and it is both accurate and complete.  MARQUEZ Obando  1/4/2023  17:25 EST

## 2023-01-04 NOTE — LETTER
January 4, 2023     Jose Angel Ortiz MD  [address] [place] [address]    Patient: Nena Merida   YOB: 1995   Date of Visit: 1/4/2023       Dear Jose Angel Ortiz MD    Nena Merida was in my office today. Below is a copy of my note.    If you have questions, please do not hesitate to call me. I look forward to following Nena along with you.         Sincerely,        MARQUEZ Obando        CC: MD Don Rodgers MD    Subjective:     Patient ID: Nena Merida is a 27 y.o. female.    CC:   Chief Complaint   Patient presents with   • ptosis, left eyelid       HPI:   History of Present Illness   Today 1/4/2023-  This is a pleasant 27-year-old female who presents for 3-month neurology follow-up on migraine headaches, which began in 2005. She was last seen in the clinic on 09/14/2022. At that time, she continued on propranolol LA 60 mg daily. Rizatriptan was working well for her. At that time, we also started her on some magnesium oxide 400 mg daily. At her last visit, she was having some left eyelid ptosis, and so we did complete additional work-up with MRI of the brain with and without contrast, and this was completed on 10/25/2022, and this is a normal MRI of the brain with no acute abnormalities and no abnormal contrast enhancement per my personal review, as well as radiology report, which I agree with. We recommended PCP recheck liver enzymes in 3 months. She is here for follow-up and reevaluation of her migraine. She also contacted us reporting that she was having some worsening migraines. We also recommended she get dilated eye exam completed. She has been evaluated by [facility], and they felt that the ptosis of her eyelid was likely a mechanical ptosis. She does follow with Dr. Robertson.    She reports that Dr. Robertson told her that it could have been a \"complicated\" migraine. She states that Dr. Robertson discussed  surgery. She reports improvement since starting propranolol. She states that she has had a small migraine. She denies having daily headaches. She states that she did use the rizatriptan one time, and it seemed to stop her symptoms quickly. She states that it makes her symptoms more manageable. She states that her regular migraines last 3 days, but her most recent migraine only lasted for 1 day. She states that she did take a second dose of rizatriptan. She notes that her symptoms were moderate pain, light and sound sensitivity and throbbing pain on the left side. She denies any nausea or vomiting.     She states that she has not taken Lexapro. She states that she is taking propranolol at night. She is scheduled for a sleep study on 01/18/2023. She adds that both of her parents have a history of sleep apnea.    She just recently started as a  for a long-term care facility. She states that she did a 20-hour shift yesterday. She reports that she is very tired today.     Prior Neuro history and workup:  Migraine headaches, which started in 2005. Previously symptoms were worsening. Triggers have been stress, anxiety, quiet after loud noises, sometimes heat and coolness have helped, medications, and sleeping. She has found that lights, noise, anxiety, and stress worsen her symptoms at times. She has had some slurred speech and drooping of her left eyelid associated with severe migraines. She has not necessarily noticed any pattern in her symptoms. She has been told to take Tylenol. She is currently on propranolol LA 60 mg daily for migraine prevention and has been prescribed rizatriptan to take at onset of migraines.     Positive Family history of migraines in her mother, sister, and grandmother.     Prior to propranolol LA from March to September of 2022 she had 16-17 headache days per month with 9 days being migraines. Her migraines typically last approximately 3 days, and she has 3 per month. Her  other headaches are in addition to the migraines. When she has a migraine, the pain is located in the neck, and sometimes it is more on the left side & can be across her forehead starting with the left side and moving to the right side. She describes the pain as throbbing and moderate to severe. She has nausea, vomiting, photophobia, and phonophobia. The patient reports 1 episode of blurry vision and disorientation in 04/2022 prior to the worst migraine she has ever had. She has not had a dilated eye exam in years, but she did see her optometrist, who diagnosed her with ptosis of the left eyelid & did take detailed pictures of the back of her eye and did not see swelling or other problems. The ptosis is intermittent, and it worsens with headaches. She denies any numbness, tingling, or weakness. She denies any double vision, shortness of breath, dysphagia, or weakness in the upper extremities. The patient denies any difficulty moving the eyes. Previously tried sumatriptan and did not tolerate this.     The patient reports that the worst migraine she has ever had was in early 05/2022, which lasted 4 days. She did not go to the hospital. She continued working through this migraine.    The following portions of the patient's history were reviewed and updated as appropriate: allergies, current medications, past family history, past medical history, past social history, past surgical history and problem list.    Past Medical History:   Diagnosis Date   • Anxiety    • Cluster headache 2005   • Common migraine    • Depression    • Esophageal reflux    • Headache, tension-type 2005       Past Surgical History:   Procedure Laterality Date   • WISDOM TOOTH EXTRACTION         Social History     Socioeconomic History   • Marital status:    Tobacco Use   • Smoking status: Never   • Smokeless tobacco: Never   Vaping Use   • Vaping Use: Never used   Substance and Sexual Activity   • Alcohol use: Yes     Alcohol/week: 2.0 -  3.0 standard drinks     Types: 2 - 3 Drinks containing 0.5 oz of alcohol per week   • Drug use: Never   • Sexual activity: Yes     Partners: Male     Comment: Last BC pull taken Oct 2021       Family History   Problem Relation Age of Onset   • Migraines Mother    • Cancer Mother    • Hypertension Mother    • Hypertension Father    • Migraines Father    • Migraines Sister    • Diabetes Other    • Hypertension Other    • Migraines Other           Current Outpatient Medications:   •  cetirizine (zyrTEC) 10 MG tablet, Take 1 tablet by mouth Daily., Disp: 90 tablet, Rfl: 1  •  famotidine (PEPCID) 20 MG tablet, Take 1 tablet by mouth 2 (Two) Times a Day., Disp: 180 tablet, Rfl: 1  •  prenatal vitamin (prenatal, CLASSIC, vitamin) tablet, Take 1 tablet by mouth As Needed., Disp: , Rfl:   •  propranolol LA (Inderal LA) 60 MG 24 hr capsule, Take 1 capsule by mouth Daily. For migraine prevention, Disp: 90 capsule, Rfl: 3  •  rizatriptan (MAXALT) 10 MG tablet, Take 1 tablet by mouth 1 (One) Time As Needed for Migraine for up to 1 dose., Disp: 36 tablet, Rfl: 3  •  escitalopram (LEXAPRO) 10 MG tablet, TAKE 1 TABLET BY MOUTH EVERY DAY, Disp: 30 tablet, Rfl: 1     Review of Systems   Neurological: Positive for headaches.   All other systems reviewed and are negative.       Objective:  /76   Pulse 70   Wt 98.4 kg (217 lb)   SpO2 99%   BMI 43.83 kg/m²     Neurologic Exam     Mental Status   Oriented to person, place, and time.   Speech: speech is normal   Level of consciousness: alert    Cranial Nerves     CN II   Visual fields full to confrontation.     CN III, IV, VI   Pupils are equal, round, and reactive to light.  Extraocular motions are normal.   Ophthalmoparesis: none    CN V   Facial sensation intact.     CN VII   Right facial weakness: none  Left facial weakness: marked left eyelid ptosis covering 40% of pupil and visual field-marked, otherwise symmetric face.    CN VIII   CN VIII normal.     CN IX, X   CN IX  normal.   CN X normal.     CN XI   CN XI normal.     CN XII   CN XII normal.     Motor Exam   Muscle bulk: normal  Overall muscle tone: normal    Strength   Strength 5/5 throughout.     Gait, Coordination, and Reflexes     Gait  Gait: normal    Coordination   Finger to nose coordination: normal    Tremor   Resting tremor: absent  Intention tremor: absent  Action tremor: absent    Reflexes   Right : 2+  Left : 2+      Physical Exam  Constitutional:       Appearance: Normal appearance.   Eyes:      Extraocular Movements: EOM normal.      Pupils: Pupils are equal, round, and reactive to light.     Neurological:      Mental Status: She is alert and oriented to person, place, and time.      Motor: Motor strength is normal.      Coordination: Finger-Nose-Finger Test normal.      Gait: Gait is intact.   Psychiatric:         Mood and Affect: Mood and affect normal.         Speech: Speech normal.     Labs on 09/14/2022, musculoskeletal negative. Acetylcholine receptor antibody panel was negative. TSH and free T4 levels were normal. Comprehensive metabolic panel showed ALT 63 and otherwise within normal limits, and CBC with differential was also within normal limits.    Assessment/Plan:      Diagnoses and all orders for this visit:    1. Chronic migraine without aura without status migrainosus, not intractable (Primary)  -     propranolol LA (Inderal LA) 60 MG 24 hr capsule; Take 1 capsule by mouth Daily. For migraine prevention  Dispense: 90 capsule; Refill: 3  -     rizatriptan (MAXALT) 10 MG tablet; Take 1 tablet by mouth 1 (One) Time As Needed for Migraine for up to 1 dose.  Dispense: 36 tablet; Refill: 3    2. Ptosis, left eyelid        She is doing excellent from a neurological standpoint. Unfortunately, she continues to have persistent left eyelid ptosis. She is meeting with Dr. Robertson to consider repair since this does cover her pupil by about 40 percent at times. He feels that she likely had a complex migraine  or migraine with aura that caused this. Since labs are unremarkable and neuroimaging is unremarkable, her migraines are very well controlled. She is going to continue her propranolol daily with the rizatriptan as needed. She has gone from having chronic migraines with 16 to 17 days a month with 9 being migraine days to over the past 3 months, having just 1 migraine day and headache day total, which is a significant improvement. She is also being evaluated by the sleep clinic coming up for evaluation of sleep apnea and I encouraged her to keep this appointment. We reviewed her labs and her neuroimaging. I recommended she have her liver enzymes rechecked with PCP in the next 3 to 6 months. She verbalizes understanding.     We will complete a follow-up in 6 months by video visit or sooner if needed.    Reviewed medications, potential side effects and signs and symptoms to report. Discussed risk versus benefits of treatment plan with patient and/or family-including medications, labs and radiology that may be ordered. Addressed questions and concerns during visit. Patient and/or family verbalized understanding and agree with plan.    AS THE PROVIDER, I PERSONALLY WORE PPE DURING ENTIRE FACE TO FACE ENCOUNTER IN CLINIC WITH THE PATIENT. PATIENT ALSO WORE PPE DURING ENTIRE FACE TO FACE ENCOUNTER EXCEPT FOR A MAX OF 30 SECONDS DURING NEUROLOGICAL EVALUATION OF CRANIAL NERVES AND THEN MASK WAS PLACED BACK OVER PATIENT FACE FOR REMAINDER OF VISIT. I WASHED MY HANDS BEFORE AND AFTER VISIT.    During this visit the following were done:  Labs Reviewed [x]    Labs Ordered []    Radiology Reports Reviewed [x]    Radiology Ordered []    PCP Records Reviewed []    Referring Provider Records Reviewed []    ER Records Reviewed []    Hospital Records Reviewed []    History Obtained From Family []    Radiology Images Reviewed [x]    Other Reviewed [x] [facility] and Dr. Robertson notes   Records Requested []      Transcribed  from ambient dictation for Loree Saleh, MARQUEZ by Yuridia Sow.  01/04/23   13:30 EST    Patient or patient representative verbalized consent to the visit recording.  I have personally performed the services described in this document as transcribed by the above individual, and it is both accurate and complete.  Loree Eduardo Saleh, MARQUEZ  1/4/2023  17:25 EST

## 2023-02-05 DIAGNOSIS — K21.9 GERD WITHOUT ESOPHAGITIS: ICD-10-CM

## 2023-02-08 RX ORDER — FAMOTIDINE 20 MG/1
TABLET, FILM COATED ORAL
Qty: 180 TABLET | Refills: 1 | Status: SHIPPED | OUTPATIENT
Start: 2023-02-08

## 2023-07-26 ENCOUNTER — TELEMEDICINE (OUTPATIENT)
Dept: NEUROLOGY | Facility: CLINIC | Age: 28
End: 2023-07-26

## 2023-07-26 DIAGNOSIS — G43.709 CHRONIC MIGRAINE WITHOUT AURA WITHOUT STATUS MIGRAINOSUS, NOT INTRACTABLE: ICD-10-CM

## 2023-07-26 PROCEDURE — 99213 OFFICE O/P EST LOW 20 MIN: CPT | Performed by: NURSE PRACTITIONER

## 2023-07-26 RX ORDER — PROPRANOLOL HCL 60 MG
60 CAPSULE, EXTENDED RELEASE 24HR ORAL DAILY
Qty: 30 CAPSULE | Refills: 11 | Status: SHIPPED | OUTPATIENT
Start: 2023-07-26

## 2023-07-26 RX ORDER — RIZATRIPTAN BENZOATE 10 MG/1
10 TABLET ORAL ONCE AS NEEDED
Qty: 12 TABLET | Refills: 11 | Status: SHIPPED | OUTPATIENT
Start: 2023-07-26

## 2023-07-26 NOTE — PROGRESS NOTES
Subjective:     Patient ID: Nena Merida is a 28 y.o. female.    CC:   Chief Complaint   Patient presents with    Migraine       HPI:   History of Present Illness  This visit was completed face to face via VIDEO by Amador/Alphonse with verbal consent to treat obtained from patient and/or family.  Provider confirmed the patient's name and  at the start of visit. Patient and/or family confirms that they are located in [place] during visit and Provider is located in Neurology Clinic in [place], [place] during visit.    Today 2023-  This is a pleasant 28-year-old female completing a 6-month neurology follow-up on migraine headaches present since . She is completing a video visit today.    Today, she reports her headaches have improved significantly with the propranolol LA. She has had to use the rizatriptan, but not as often as she had to before the propranolol LA. She reports she is having 2 to 3 migraine days per month which is significantly less than before. Her migraines last approximately 1 day. They previously lasted up to 3 days. She took magnesium for a couple of weeks, but this upset her stomach, so she is no longer taking it. She states this is the best she has felt with her headaches in a long time.     In regard to her left eyelid ptosis, she states Dr. Robertson recommended she have surgery. She was told her insurance would cover it because it is affecting so much of her vision. Shortly after this, she switched jobs and then was out of a job for 3 months, so insurance was an issue. Insurance is no longer an issue, but she is debating whether she wants to have surgery due to the recovery time.     She reports her sleep study keeps getting postponed, so it is not scheduled until 10/2023.    Prior Neuro history and workup:  Migraine headaches, which started in . Previously symptoms were worsening. Triggers have been stress, anxiety, quiet after loud noises, sometimes heat and coolness have helped,  medications, and sleeping. She has found that lights, noise, anxiety, and stress worsen her symptoms at times. She has had some slurred speech and drooping of her left eyelid associated with severe migraines. She has not necessarily noticed any pattern in her symptoms. She has been told to take Tylenol. She is currently on propranolol LA 60 mg daily for migraine prevention and has been prescribed rizatriptan to take at onset of migraines.     Positive Family history of migraines in her mother, sister, and grandmother.     Prior to propranolol LA from March to September of 2022 she had 16-17 headache days per month with 9 days being migraines. Her migraines typically last approximately 3 days, and she has 3 per month. Her other headaches are in addition to the migraines. When she has a migraine, the pain is located in the neck, and sometimes it is more on the left side & can be across her forehead starting with the left side and moving to the right side. She describes the pain as throbbing and moderate to severe. She has nausea, vomiting, photophobia, and phonophobia. The patient reports 1 episode of blurry vision and disorientation in 04/2022 prior to the worst migraine she has ever had. She has not had a dilated eye exam in years, but she did see her optometrist, who diagnosed her with ptosis of the left eyelid & did take detailed pictures of the back of her eye and did not see swelling or other problems. The ptosis is intermittent, and it worsens with headaches. She denies any numbness, tingling, or weakness. She denies any double vision, shortness of breath, dysphagia, or weakness in the upper extremities. The patient denies any difficulty moving the eyes. Previously tried sumatriptan and did not tolerate this.     The patient reports that the worst migraine she has ever had was in early 05/2022, which lasted 4 days. She did not go to the hospital. She continued working through this migraine.  She has gone from  having chronic migraines with 16 to 17 days a month with 9 being migraine days to over the past 3 months, having just 1 migraine day and headache day total, which is a significant improvement.     MRI of the brain with and without contrast, and this was completed on 10/25/2022, and this is a normal MRI of the brain with no acute abnormalities and no abnormal contrast enhancement per my personal review, as well as radiology report, which I agree with.     She has been evaluated by [facility], and they felt that the ptosis of her eyelid was likely a mechanical ptosis. She does follow with Dr. Robertson. Left eyelid ptosis. Recommended for repair and waiting for now.    Magnesium caused GI upset and was discontinued.    The following portions of the patient's history were reviewed and updated as appropriate: allergies, current medications, past family history, past medical history, past social history, past surgical history, and problem list.    Past Medical History:   Diagnosis Date    Anxiety     Cluster headache 2005    Common migraine     Depression     Esophageal reflux     Headache, tension-type 2005       Past Surgical History:   Procedure Laterality Date    WISDOM TOOTH EXTRACTION         Social History     Socioeconomic History    Marital status:    Tobacco Use    Smoking status: Never    Smokeless tobacco: Never   Vaping Use    Vaping Use: Never used   Substance and Sexual Activity    Alcohol use: Yes     Alcohol/week: 2.0 - 3.0 standard drinks     Types: 2 - 3 Drinks containing 0.5 oz of alcohol per week    Drug use: Never    Sexual activity: Yes     Partners: Male     Comment: Last BC pull taken Oct 2021       Family History   Problem Relation Age of Onset    Migraines Mother     Cancer Mother     Hypertension Mother     Hypertension Father     Migraines Father     Migraines Sister     Diabetes Other     Hypertension Other     Migraines Other           Current Outpatient Medications:      propranolol LA (Inderal LA) 60 MG 24 hr capsule, Take 1 capsule by mouth Daily. For migraine prevention, Disp: 30 capsule, Rfl: 11    rizatriptan (MAXALT) 10 MG tablet, Take 1 tablet by mouth 1 (One) Time As Needed for Migraine for up to 144 doses., Disp: 12 tablet, Rfl: 11    cetirizine (zyrTEC) 10 MG tablet, Take 1 tablet by mouth Daily., Disp: 90 tablet, Rfl: 1    escitalopram (LEXAPRO) 10 MG tablet, TAKE 1 TABLET BY MOUTH EVERY DAY, Disp: 30 tablet, Rfl: 1    famotidine (PEPCID) 20 MG tablet, TAKE 1 TABLET BY MOUTH TWICE A DAY, Disp: 180 tablet, Rfl: 1    prenatal vitamin (prenatal, CLASSIC, vitamin) tablet, Take 1 tablet by mouth As Needed., Disp: , Rfl:      Review of Systems   Eyes:  Positive for visual disturbance.   Neurological:  Positive for headaches.   All other systems reviewed and are negative.     Objective:  There were no vitals taken for this visit.  Not obtained d/t video visit    Neurologic Exam     Mental Status   Oriented to person, place, and time.   Speech: speech is normal   Level of consciousness: alert    Cranial Nerves     CN II   Visual fields full to confrontation.     CN III, IV, VI   Pupils are equal, round, and reactive to light.  Extraocular motions are normal.     CN VII   Left facial weakness: left eyelid ptosis at baseline, has suglasses on during visit today while driving.    Physical Exam  Constitutional:       Appearance: Normal appearance.   Eyes:      Extraocular Movements: EOM normal.      Pupils: Pupils are equal, round, and reactive to light.   Neurological:      Mental Status: She is alert and oriented to person, place, and time.   Psychiatric:         Mood and Affect: Mood and affect normal.         Speech: Speech normal.   J CARLOS fully d/t video visit    Assessment/Plan:       Diagnoses and all orders for this visit:    1. Chronic migraine without aura without status migrainosus, not intractable  -     rizatriptan (MAXALT) 10 MG tablet; Take 1 tablet by mouth 1 (One)  Time As Needed for Migraine for up to 144 doses.  Dispense: 12 tablet; Refill: 11  -     propranolol LA (Inderal LA) 60 MG 24 hr capsule; Take 1 capsule by mouth Daily. For migraine prevention  Dispense: 30 capsule; Refill: 11         She is doing well. Her migraines are excellent. She will follow up with her eye specialist when she is able in regard to ptosis surgery. She feels that her migraines are doing awesome, the best she has felt in a long time. She wishes to continue her current medications. She will follow up by video next time per her request in 01/2024. She will call us with any additional questions or concerns prior to follow up in clinic. Still encouraged her to keep appointment with sleep evaluation whenever that is scheduled. She verbalizes understanding and agrees with plan moving forward today.    Reviewed medications, potential side effects and signs and symptoms to report. Discussed risk versus benefits of treatment plan with patient and/or family-including medications, labs and radiology that may be ordered. Addressed questions and concerns during visit. Patient and/or family verbalized understanding and agree with plan.    Note to patient: The 21st Century Cures Act makes medical notes like these available to patients in the interest of transparency. However, be advised this is a medical document. It is intended as peer to peer communication. It is written in medical language and may contain abbreviations or verbiage that are unfamiliar. It may appear blunt or direct. Medical documents are intended to carry relevant information, facts as evident, and the clinical opinion of the provider.      Transcribed from ambient dictation for MARQUEZ Obando by Gladis Hatfield.  07/26/23   18:28 EDT    Patient or patient representative verbalized consent to the visit recording.  I have personally performed the services described in this document as transcribed by the above individual, and it is  both accurate and complete.  Loree Saleh, MARQUEZ  7/27/2023  12:31 EDT
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
## 2023-09-07 ENCOUNTER — HOSPITAL ENCOUNTER (EMERGENCY)
Facility: HOSPITAL | Age: 28
Discharge: HOME | End: 2023-09-07
Attending: EMERGENCY MEDICINE
Payer: COMMERCIAL

## 2023-09-07 ENCOUNTER — APPOINTMENT (OUTPATIENT)
Dept: GENERAL RADIOLOGY | Facility: HOSPITAL | Age: 28
End: 2023-09-07
Payer: COMMERCIAL

## 2023-09-07 VITALS
BODY MASS INDEX: 44.35 KG/M2 | HEART RATE: 85 BPM | TEMPERATURE: 98.2 F | SYSTOLIC BLOOD PRESSURE: 139 MMHG | OXYGEN SATURATION: 98 % | HEIGHT: 59 IN | WEIGHT: 220 LBS | RESPIRATION RATE: 16 BRPM | DIASTOLIC BLOOD PRESSURE: 85 MMHG

## 2023-09-07 DIAGNOSIS — S39.012A LUMBAR STRAIN, INITIAL ENCOUNTER: ICD-10-CM

## 2023-09-07 DIAGNOSIS — S16.1XXA STRAIN OF NECK MUSCLE, INITIAL ENCOUNTER: ICD-10-CM
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
## 2023-09-07 PROCEDURE — 72100 X-RAY EXAM L-S SPINE 2/3 VWS: CPT

## 2023-09-07 PROCEDURE — 96372 THER/PROPH/DIAG INJ SC/IM: CPT

## 2023-09-07 PROCEDURE — 72040 X-RAY EXAM NECK SPINE 2-3 VW: CPT

## 2023-09-07 PROCEDURE — 99282 EMERGENCY DEPT VISIT SF MDM: CPT

## 2023-09-07 PROCEDURE — 25010000002 KETOROLAC TROMETHAMINE PER 15 MG: Performed by: NURSE PRACTITIONER

## 2023-09-07 RX ORDER — KETOROLAC TROMETHAMINE 30 MG/ML
30 INJECTION, SOLUTION INTRAMUSCULAR; INTRAVENOUS ONCE
Status: COMPLETED | OUTPATIENT
Start: 2023-09-07 | End: 2023-09-07

## 2023-09-07 RX ORDER — METHOCARBAMOL 750 MG/1
750 TABLET, FILM COATED ORAL 3 TIMES DAILY PRN
Qty: 15 TABLET | Refills: 0 | Status: SHIPPED | OUTPATIENT
Start: 2023-09-07

## 2023-09-07 RX ORDER — NAPROXEN 500 MG/1
500 TABLET ORAL 2 TIMES DAILY PRN
Qty: 10 TABLET | Refills: 0 | Status: SHIPPED | OUTPATIENT
Start: 2023-09-07 | End: 2023-09-12

## 2023-09-07 RX ADMIN — KETOROLAC TROMETHAMINE 30 MG: 30 INJECTION, SOLUTION INTRAMUSCULAR at 14:51
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
## 2024-01-03 ENCOUNTER — TELEMEDICINE (OUTPATIENT)
Dept: NEUROLOGY | Facility: CLINIC | Age: 29
End: 2024-01-03
Payer: COMMERCIAL

## 2024-01-03 DIAGNOSIS — G43.009 MIGRAINE WITHOUT AURA AND WITHOUT STATUS MIGRAINOSUS, NOT INTRACTABLE: Primary | ICD-10-CM

## 2024-01-03 DIAGNOSIS — H02.402 PTOSIS, LEFT EYELID: ICD-10-CM

## 2024-01-03 PROCEDURE — 99214 OFFICE O/P EST MOD 30 MIN: CPT | Performed by: NURSE PRACTITIONER

## 2024-01-03 NOTE — LETTER
January 3, 2024     Jose Angel Ortiz MD  [address] [place] [address]    Patient: Nena Merida   YOB: 1995   Date of Visit: 1/3/2024     Dear Jose Angel Ortiz MD:       Thank you for referring Nena Merida to me for evaluation. Below are the relevant portions of my assessment and plan of care.    If you have questions, please do not hesitate to call me. I look forward to following Nena along with you.         Sincerely,        MARQUEZ Obando        CC: MD Delfino Rodgers Ashley Moore, APRN  24  Sign when Signing Visit  Subjective:     Patient ID: Nena Merida is a 28 y.o. female.    CC:   Chief Complaint   Patient presents with   • Headache       HPI:   History of Present Illness  This visit was completed face to face via VIDEO by Epic/[employer] with verbal consent to treat obtained from patient and/or family.  Provider confirmed patient's name and  at start of visit. Patient and/or family confirms that they are located in [place] during visit and Provider is located in [facility]rology Clinic in [place], [place] during visit.    Today 1/3/2024- This is a pleasant 28-year-old female completing a video visit follow-up for migraines. She last completed video visit on 2023.    Today, she reports she has been doing well for the most part. Since 2023, her migraines have been more frequent. She states she has had 1 week without a migraine since 2023. She believes her migraines were stress related from the holidays. Prior to the holidays, she felt the propranolol was working well for her. Lately, she has been having migraines once per week, and they last a couple of days. She states the rizatriptan and finds this is helpful.    She is currently trying to get pregnant. She has tried magnesium oxide 400 mg in the past, but it caused her to have diarrhea. She has been trying to drink 5 bottles of water per day, but usually she has 3  bottles of water.    She saw Dr. Robretson for her left eyelid ptosis, and he told her it has improved. She has not had it repaired.    Prior Neuro history and workup:  Migraine headaches, which started in 2005. Previously symptoms were worsening. Triggers have been stress, anxiety, quiet after loud noises, sometimes heat and coolness have helped, medications, and sleeping. She has found that lights, noise, anxiety, and stress worsen her symptoms at times. She has had some slurred speech and drooping of her left eyelid associated with severe migraines. She has not necessarily noticed any pattern in her symptoms. She has been told to take Tylenol. She is currently on propranolol LA 60 mg daily for migraine prevention and has been prescribed rizatriptan to take at onset of migraines.     Positive Family history of migraines in her mother, sister, and grandmother.     Prior to propranolol LA from March to September of 2022 she had 16-17 headache days per month with 9 days being migraines. Her migraines typically last approximately 3 days, and she has 3 per month. Her other headaches are in addition to the migraines. When she has a migraine, the pain is located in the neck, and sometimes it is more on the left side & can be across her forehead starting with the left side and moving to the right side. She describes the pain as throbbing and moderate to severe. She has nausea, vomiting, photophobia, and phonophobia. The patient reports 1 episode of blurry vision and disorientation in 04/2022 prior to the worst migraine she has ever had. She has not had a dilated eye exam in years, but she did see her optometrist, who diagnosed her with ptosis of the left eyelid & did take detailed pictures of the back of her eye and did not see swelling or other problems. The ptosis is intermittent, and it worsens with headaches. She denies any numbness, tingling, or weakness. She denies any double vision, shortness of breath, dysphagia, or  weakness in the upper extremities. The patient denies any difficulty moving the eyes. Previously tried sumatriptan and did not tolerate this.     The patient reports that the worst migraine she has ever had was in early 05/2022, which lasted 4 days. She did not go to the hospital. She continued working through this migraine.  She has gone from having chronic migraines with 16 to 17 days a month with 9 being migraine days to over the past 3 months, having just 1 migraine day and headache day total, which is a significant improvement.      MRI of the brain with and without contrast, and this was completed on 10/25/2022, and this is a normal MRI of the brain with no acute abnormalities and no abnormal contrast enhancement per my personal review, as well as radiology report, which I agree with.      She has been evaluated by [facility], and they felt that the ptosis of her eyelid was likely a mechanical ptosis. She does follow with Dr. Robertson. Left eyelid ptosis. Recommended for repair and waiting for now.     Magnesium oxide caused GI upset and was discontinued.    The following portions of the patient's history were reviewed and updated as appropriate: allergies, current medications, past family history, past medical history, past social history, past surgical history, and problem list.    Past Medical History:   Diagnosis Date   • Anxiety    • Cluster headache 2005   • Common migraine    • Depression    • Esophageal reflux    • Headache, tension-type 2005       Past Surgical History:   Procedure Laterality Date   • WISDOM TOOTH EXTRACTION         Social History     Socioeconomic History   • Marital status:    Tobacco Use   • Smoking status: Never   • Smokeless tobacco: Never   Vaping Use   • Vaping Use: Never used   Substance and Sexual Activity   • Alcohol use: Yes     Alcohol/week: 2.0 - 3.0 standard drinks of alcohol     Types: 2 - 3 Drinks containing 0.5 oz of alcohol per week   • Drug  use: Never   • Sexual activity: Yes     Partners: Male     Comment: Last BC pull taken Oct 2021       Family History   Problem Relation Age of Onset   • Migraines Mother    • Cancer Mother    • Hypertension Mother    • Hypertension Father    • Migraines Father    • Migraines Sister    • Diabetes Other    • Hypertension Other    • Migraines Other           Current Outpatient Medications:   •  cetirizine (zyrTEC) 10 MG tablet, Take 1 tablet by mouth Daily., Disp: 90 tablet, Rfl: 1  •  famotidine (PEPCID) 20 MG tablet, TAKE 1 TABLET BY MOUTH TWICE A DAY, Disp: 180 tablet, Rfl: 1  •  prenatal vitamin (prenatal, CLASSIC, vitamin) tablet, Take 1 tablet by mouth As Needed., Disp: , Rfl:   •  propranolol LA (Inderal LA) 60 MG 24 hr capsule, Take 1 capsule by mouth Daily. For migraine prevention, Disp: 30 capsule, Rfl: 11  •  rizatriptan (MAXALT) 10 MG tablet, Take 1 tablet by mouth 1 (One) Time As Needed for Migraine for up to 144 doses., Disp: 12 tablet, Rfl: 11     Review of Systems   Neurological:  Positive for headaches.   Psychiatric/Behavioral:  The patient is nervous/anxious.    All other systems reviewed and are negative.       Objective:  There were no vitals taken for this visit.  Not obtained d/t video visit    Neurologic Exam     Mental Status   Oriented to person, place, and time.   Speech: speech is normal   Level of consciousness: alert    Cranial Nerves     CN II   Visual fields full to confrontation.     CN III, IV, VI   Pupils are equal, round, and reactive to light.  Extraocular motions are normal.     CN VII   Facial expression full, symmetric.   Left facial weakness: left eyelid ptosis mild to moderate, improved today.    CN VIII   CN VIII normal.       Physical Exam  Constitutional:       Appearance: Normal appearance.   Eyes:      Extraocular Movements: EOM normal.      Pupils: Pupils are equal, round, and reactive to light.   Neurological:      Mental Status: She is alert and oriented to person,  place, and time.   Psychiatric:         Mood and Affect: Mood and affect normal.         Speech: Speech normal.     Not fully assessed d/t video visit    Assessment/Plan:       Diagnoses and all orders for this visit:    1. Migraine without aura and without status migrainosus, not intractable (Primary)  Comments:  worsened, will add magnesium glycinate for now, trying for pregnancy, monitor, call if not better    2. Ptosis, left eyelid  Comments:  stable, continue with Dr. Robertson    She is currently trying to get pregnant, so we will not be adding medications at this time. She is aware that once she is pregnant to not use the triptans. She can continue propranolol and rizatriptan for now. We will try the magnesium glycinate. Once she becomes pregnant, she should contact her OB provider about the medication she is taking and if any changes are recommended. If she were to worsen and decided to wait on pregnancy, could consider trying CGRP receptor antagonist injections.    She continues to follow with Dr. Robertson for the left eyelid ptosis, which has improved.     She will call with any questions or concerns prior to follow-up. She verbalized understanding and agrees with the plan moving forward today.     Reviewed medications, potential side effects and signs and symptoms to report. Discussed risk versus benefits of treatment plan with patient and/or family-including medications, labs and radiology that may be ordered. Addressed questions and concerns during visit. Patient and/or family verbalized understanding and agree with plan.      During this visit the following were done:  Labs Reviewed []    Labs Ordered []    Radiology Reports Reviewed []    Radiology Ordered []    PCP Records Reviewed [x]    Referring Provider Records Reviewed []    ER Records Reviewed []    Hospital Records Reviewed []    History Obtained From Family []    Radiology Images Reviewed []    Other Reviewed [x]  Dr. Robertson  Records Requested []       Transcribed from ambient dictation for Loree Saleh, MARQUEZ by Clement Contreras.  01/03/24   16:44 EST    Patient or patient representative verbalized consent to the visit recording.  I have personally performed the services described in this document as transcribed by the above individual, and it is both accurate and complete.  Loree Saleh, MARQUEZ  1/3/2024  19:33 EST      Note to patient: The 21st Century Cures Act makes medical notes like these available to patients in the interest of transparency. However, be advised this is a medical document. It is intended as peer to peer communication. It is written in medical language and may contain abbreviations or verbiage that are unfamiliar. It may appear blunt or direct. Medical documents are intended to carry relevant information, facts as evident, and the clinical opinion of the provider.

## 2024-01-03 NOTE — PROGRESS NOTES
Subjective:     Patient ID: Nena Merida is a 28 y.o. female.    CC:   Chief Complaint   Patient presents with    Headache       HPI:   History of Present Illness  This visit was completed face to face via VIDEO by Epic/[employer] with verbal consent to treat obtained from patient and/or family.  Provider confirmed patient's name and  at start of visit. Patient and/or family confirms that they are located in [place] during visit and Provider is located in [facility]rology Clinic in [place], [place] during visit.    Today 1/3/2024- This is a pleasant 28-year-old female completing a video visit follow-up for migraines. She last completed video visit on 2023.    Today, she reports she has been doing well for the most part. Since 2023, her migraines have been more frequent. She states she has had 1 week without a migraine since 2023. She believes her migraines were stress related from the holidays. Prior to the holidays, she felt the propranolol was working well for her. Lately, she has been having migraines once per week, and they last a couple of days. She states the rizatriptan and finds this is helpful.    She is currently trying to get pregnant. She has tried magnesium oxide 400 mg in the past, but it caused her to have diarrhea. She has been trying to drink 5 bottles of water per day, but usually she has 3 bottles of water.    She saw Dr. Robertson for her left eyelid ptosis, and he told her it has improved. She has not had it repaired.    Prior Neuro history and workup:  Migraine headaches, which started in . Previously symptoms were worsening. Triggers have been stress, anxiety, quiet after loud noises, sometimes heat and coolness have helped, medications, and sleeping. She has found that lights, noise, anxiety, and stress worsen her symptoms at times. She has had some slurred speech and drooping of her left eyelid associated with severe migraines. She has not necessarily noticed any pattern in her  symptoms. She has been told to take Tylenol. She is currently on propranolol LA 60 mg daily for migraine prevention and has been prescribed rizatriptan to take at onset of migraines.     Positive Family history of migraines in her mother, sister, and grandmother.     Prior to propranolol LA from March to September of 2022 she had 16-17 headache days per month with 9 days being migraines. Her migraines typically last approximately 3 days, and she has 3 per month. Her other headaches are in addition to the migraines. When she has a migraine, the pain is located in the neck, and sometimes it is more on the left side & can be across her forehead starting with the left side and moving to the right side. She describes the pain as throbbing and moderate to severe. She has nausea, vomiting, photophobia, and phonophobia. The patient reports 1 episode of blurry vision and disorientation in 04/2022 prior to the worst migraine she has ever had. She has not had a dilated eye exam in years, but she did see her optometrist, who diagnosed her with ptosis of the left eyelid & did take detailed pictures of the back of her eye and did not see swelling or other problems. The ptosis is intermittent, and it worsens with headaches. She denies any numbness, tingling, or weakness. She denies any double vision, shortness of breath, dysphagia, or weakness in the upper extremities. The patient denies any difficulty moving the eyes. Previously tried sumatriptan and did not tolerate this.     The patient reports that the worst migraine she has ever had was in early 05/2022, which lasted 4 days. She did not go to the hospital. She continued working through this migraine.  She has gone from having chronic migraines with 16 to 17 days a month with 9 being migraine days to over the past 3 months, having just 1 migraine day and headache day total, which is a significant improvement.      MRI of the brain with and without contrast, and this was  completed on 10/25/2022, and this is a normal MRI of the brain with no acute abnormalities and no abnormal contrast enhancement per my personal review, as well as radiology report, which I agree with.      She has been evaluated by [facility], and they felt that the ptosis of her eyelid was likely a mechanical ptosis. She does follow with Dr. Robertson. Left eyelid ptosis. Recommended for repair and waiting for now.     Magnesium oxide caused GI upset and was discontinued.    The following portions of the patient's history were reviewed and updated as appropriate: allergies, current medications, past family history, past medical history, past social history, past surgical history, and problem list.    Past Medical History:   Diagnosis Date    Anxiety     Cluster headache 2005    Common migraine     Depression     Esophageal reflux     Headache, tension-type 2005       Past Surgical History:   Procedure Laterality Date    WISDOM TOOTH EXTRACTION         Social History     Socioeconomic History    Marital status:    Tobacco Use    Smoking status: Never    Smokeless tobacco: Never   Vaping Use    Vaping Use: Never used   Substance and Sexual Activity    Alcohol use: Yes     Alcohol/week: 2.0 - 3.0 standard drinks of alcohol     Types: 2 - 3 Drinks containing 0.5 oz of alcohol per week    Drug use: Never    Sexual activity: Yes     Partners: Male     Comment: Last BC pull taken Oct 2021       Family History   Problem Relation Age of Onset    Migraines Mother     Cancer Mother     Hypertension Mother     Hypertension Father     Migraines Father     Migraines Sister     Diabetes Other     Hypertension Other     Migraines Other           Current Outpatient Medications:     cetirizine (zyrTEC) 10 MG tablet, Take 1 tablet by mouth Daily., Disp: 90 tablet, Rfl: 1    famotidine (PEPCID) 20 MG tablet, TAKE 1 TABLET BY MOUTH TWICE A DAY, Disp: 180 tablet, Rfl: 1    prenatal vitamin (prenatal, CLASSIC,  vitamin) tablet, Take 1 tablet by mouth As Needed., Disp: , Rfl:     propranolol LA (Inderal LA) 60 MG 24 hr capsule, Take 1 capsule by mouth Daily. For migraine prevention, Disp: 30 capsule, Rfl: 11    rizatriptan (MAXALT) 10 MG tablet, Take 1 tablet by mouth 1 (One) Time As Needed for Migraine for up to 144 doses., Disp: 12 tablet, Rfl: 11     Review of Systems   Neurological:  Positive for headaches.   Psychiatric/Behavioral:  The patient is nervous/anxious.    All other systems reviewed and are negative.       Objective:  There were no vitals taken for this visit.  Not obtained d/t video visit    Neurologic Exam     Mental Status   Oriented to person, place, and time.   Speech: speech is normal   Level of consciousness: alert    Cranial Nerves     CN II   Visual fields full to confrontation.     CN III, IV, VI   Pupils are equal, round, and reactive to light.  Extraocular motions are normal.     CN VII   Facial expression full, symmetric.   Left facial weakness: left eyelid ptosis mild to moderate, improved today.    CN VIII   CN VIII normal.       Physical Exam  Constitutional:       Appearance: Normal appearance.   Eyes:      Extraocular Movements: EOM normal.      Pupils: Pupils are equal, round, and reactive to light.   Neurological:      Mental Status: She is alert and oriented to person, place, and time.   Psychiatric:         Mood and Affect: Mood and affect normal.         Speech: Speech normal.     Not fully assessed d/t video visit    Assessment/Plan:       Diagnoses and all orders for this visit:    1. Migraine without aura and without status migrainosus, not intractable (Primary)  Comments:  worsened, will add magnesium glycinate for now, trying for pregnancy, monitor, call if not better    2. Ptosis, left eyelid  Comments:  stable, continue with Dr. Robertson    She is currently trying to get pregnant, so we will not be adding medications at this time. She is aware that once she is pregnant to not use  the triptans. She can continue propranolol and rizatriptan for now. We will try the magnesium glycinate. Once she becomes pregnant, she should contact her OB provider about the medication she is taking and if any changes are recommended. If she were to worsen and decided to wait on pregnancy, could consider trying CGRP receptor antagonist injections.    She continues to follow with Dr. Robertson for the left eyelid ptosis, which has improved.     She will call with any questions or concerns prior to follow-up. She verbalized understanding and agrees with the plan moving forward today.     Reviewed medications, potential side effects and signs and symptoms to report. Discussed risk versus benefits of treatment plan with patient and/or family-including medications, labs and radiology that may be ordered. Addressed questions and concerns during visit. Patient and/or family verbalized understanding and agree with plan.      During this visit the following were done:  Labs Reviewed []    Labs Ordered []    Radiology Reports Reviewed []    Radiology Ordered []    PCP Records Reviewed [x]    Referring Provider Records Reviewed []    ER Records Reviewed []    Hospital Records Reviewed []    History Obtained From Family []    Radiology Images Reviewed []    Other Reviewed [x]  Dr. Robertson  Records Requested []      Transcribed from ambient dictation for MARQUEZ Obando by Clement Contreras.  01/03/24   16:44 EST    Patient or patient representative verbalized consent to the visit recording.  I have personally performed the services described in this document as transcribed by the above individual, and it is both accurate and complete.  MARQUEZ Obando  1/3/2024  19:33 EST      Note to patient: The 21st Century Cures Act makes medical notes like these available to patients in the interest of transparency. However, be advised this is a medical document. It is intended as peer to peer communication. It is written in  medical language and may contain abbreviations or verbiage that are unfamiliar. It may appear blunt or direct. Medical documents are intended to carry relevant information, facts as evident, and the clinical opinion of the provider.

## 2024-01-05 ENCOUNTER — PATIENT ROUNDING (BHMG ONLY) (OUTPATIENT)
Dept: NEUROLOGY | Facility: CLINIC | Age: 29
End: 2024-01-05
Payer: COMMERCIAL

## 2024-01-05 NOTE — PROGRESS NOTES
A My-Chart message has been sent to the patient for PATIENT ROUNDING with [facility]

## 2024-02-21 ENCOUNTER — PATIENT MESSAGE (OUTPATIENT)
Dept: FAMILY MEDICINE CLINIC | Facility: CLINIC | Age: 29
End: 2024-02-21
Payer: COMMERCIAL

## 2024-02-23 NOTE — TELEPHONE ENCOUNTER
From: Nena Merida  To: Jose Angel Ortiz  Sent: 2/21/2024 12:38 PM EST  Subject: Immunizations    Hi Dr. Ortiz! I hope you're well.   I am attending [employer] for a graduate program and they are requiring an immunization record for me. 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 I've asked if our Employee Health nurse could complete it and she told me no. Would you be able to complete their form (which I am attaching) using the records attached (I have a varicella Vaccine document and a COVID Vaccine document it wouldn't let me attach as well)? And if I do not meet their requirements, would I be able to make an appointment to have what I need completed and the form finished then?     So sorry to bother you with a thing like this. I know you have more important things to tend to! Thank you so much for all you do!!

## 2024-05-16 ENCOUNTER — OFFICE VISIT (OUTPATIENT)
Dept: OBSTETRICS AND GYNECOLOGY | Facility: CLINIC | Age: 29
End: 2024-05-16
Payer: COMMERCIAL

## 2024-05-16 VITALS
SYSTOLIC BLOOD PRESSURE: 138 MMHG | WEIGHT: 236 LBS | BODY MASS INDEX: 47.58 KG/M2 | DIASTOLIC BLOOD PRESSURE: 88 MMHG | HEIGHT: 59 IN

## 2024-05-16 DIAGNOSIS — Z01.419 WOMEN'S ANNUAL ROUTINE GYNECOLOGICAL EXAMINATION: Primary | ICD-10-CM

## 2024-05-16 NOTE — PROGRESS NOTES
Gynecologic Annual Exam Note        Gynecologic Exam (annual)        Subjective     HPI  Nena Merida is a 29 y.o.  female who presents for annual well woman exam as a new patient. Patient's last menstrual period was 2024. Her periods occur every 30 days, lasting 3 days.  The flow is light. She reports dysmenorrhea is moderate occurring first 1-2 days of flow. Patient reports that this period was 5 days late and very heavy, saturating a super tampon every 2 hours. Patient reports that they heavy bleeding lasted 1-2 days and was abnormal for her. Marital Status: .  She is sexually active. She has not had new partners.. STD testing recommendations have been explained to the patient and she does not desire STD testing.    The patient would like to discuss the following complaints today: Patient states she has been trying to conceive almost 2 years and has been unsuccessful. Patient is using ovulation kits and she is ovulating. Her partner did an at home semen concentration kit and it had negative results.       Additional OB/GYN History   contraceptive methods: None  Desires to: do not start contraception  Thromboembolic Disease: none  History of migraines: yes without aura  Age of menarche: 10    History of STD: no    Last Pap : >5 years. Results: negative. HPV: unknown .   Last Completed Pap Smear       This patient has no relevant Health Maintenance data.             History of abnormal Pap smear: no  Gardasil status:unsure if she received the vaccine  Family history of uterine, colon, breast, or ovarian cancer: yes - Breast Ca- Mother  Performs monthly Self-Breast Exam: no  Exercises Regularly:yes  Feelings of Anxiety or Depression: yes - Anxiety & Depression. Patient states that it is managed well now.  Tobacco Usage?: No       Current Outpatient Medications:     cetirizine (zyrTEC) 10 MG tablet, Take 1 tablet by mouth Daily., Disp: 90 tablet, Rfl: 1    famotidine (PEPCID) 20 MG tablet,  TAKE 1 TABLET BY MOUTH TWICE A DAY, Disp: 180 tablet, Rfl: 1    prenatal vitamin (prenatal, CLASSIC, vitamin) tablet, Take 1 tablet by mouth As Needed., Disp: , Rfl:     propranolol LA (Inderal LA) 60 MG 24 hr capsule, Take 1 capsule by mouth Daily. For migraine prevention, Disp: 30 capsule, Rfl: 11    rizatriptan (MAXALT) 10 MG tablet, Take 1 tablet by mouth 1 (One) Time As Needed for Migraine for up to 144 doses., Disp: 12 tablet, Rfl: 11     Patient denies the need for medication refills today.    OB History          0    Para   0    Term   0       0    AB   0    Living   0         SAB   0    IAB   0    Ectopic   0    Molar   0    Multiple   0    Live Births   0                Health Maintenance   Topic Date Due    Annual Gynecologic Pelvic and Breast Exam  Never done    PAP SMEAR  Never done    ANNUAL PHYSICAL  2023    BMI FOLLOWUP  2023    COVID-19 Vaccine (3 - -24 season) 2023    INFLUENZA VACCINE  2024    TDAP/TD VACCINES (3 - Td or Tdap) 2025    Pneumococcal Vaccine 0-64  Aged Out    HEPATITIS C SCREENING  Discontinued       Past Medical History:   Diagnosis Date    Anxiety     Cluster headache     Common migraine     Depression     Esophageal reflux     Headache, tension-type         Past Surgical History:   Procedure Laterality Date    WISDOM TOOTH EXTRACTION         The additional following portions of the patient's history were reviewed and updated as appropriate: allergies, current medications, past family history, past medical history, past social history, past surgical history, and problem list.    Review of Systems   Constitutional: Negative.    HENT: Negative.     Eyes: Negative.    Respiratory: Negative.     Cardiovascular: Negative.    Gastrointestinal: Negative.    Endocrine: Negative.    Genitourinary: Negative.  Positive for menstrual problem.   Musculoskeletal: Negative.    Skin: Negative.    Allergic/Immunologic: Negative.   "  Neurological: Negative.    Hematological: Negative.    Psychiatric/Behavioral: Negative.           I have reviewed and agree with the HPI, ROS, and historical information as entered above. Mar Qureshi, APRN          Objective   /88   Ht 149.9 cm (59\")   Wt 107 kg (236 lb)   LMP 05/11/2024   BMI 47.67 kg/m²     Physical Exam  Vitals and nursing note reviewed. Exam conducted with a chaperone present.   Constitutional:       General: She is not in acute distress.     Appearance: Normal appearance. She is well-developed. She is not ill-appearing.   Neck:      Thyroid: No thyroid mass or thyromegaly.   Pulmonary:      Effort: Pulmonary effort is normal. No respiratory distress or retractions.   Chest:      Chest wall: No mass.   Breasts:     Right: Normal. No mass, nipple discharge, skin change or tenderness.      Left: Normal. No mass, nipple discharge, skin change or tenderness.   Abdominal:      General: There is no distension.      Palpations: Abdomen is soft. Abdomen is not rigid. There is no mass.      Tenderness: There is no abdominal tenderness. There is no guarding or rebound.      Hernia: No hernia is present.   Genitourinary:     General: Normal vulva.      Exam position: Lithotomy position.      Labia:         Right: No rash, tenderness or lesion.         Left: No rash, tenderness or lesion.       Vagina: Normal. Bleeding present. No vaginal discharge or lesions.      Cervix: Normal. Friability and cervical bleeding present. No cervical motion tenderness.      Uterus: Normal. Not enlarged, not fixed and not tender.       Adnexa: Right adnexa normal and left adnexa normal.        Right: No mass or tenderness.          Left: No mass or tenderness.        Rectum: Normal. No external hemorrhoid.      Comments: Still spotting from menses  Musculoskeletal:      Cervical back: No muscular tenderness.   Skin:     General: Skin is warm and dry.   Neurological:      Mental Status: She is alert and oriented " to person, place, and time.   Psychiatric:         Mood and Affect: Mood normal.         Behavior: Behavior normal.            Assessment and Plan    Problem List Items Addressed This Visit    None  Visit Diagnoses       Women's annual routine gynecological examination    -  Primary    Relevant Orders    LIQUID-BASED PAP SMEAR WITH HPV GENOTYPING REGARDLESS OF INTERPRETATION ([facility]KAELA,COR,MAD)            GYN annual well woman exam.   Reviewed pap guidelines. Obtained today.   Discussed BP, states it was high due to the parking situation. Advised continued monitoring of this especially prior to conception.   Written order given to pt for semen analysis at Dr. Leiva's office, [facility], [place]. If this is abnormal they will refer her partner to urology.   Reviewed monthly self breast exams.  Instructed to call with lumps, pain, or breast discharge.    Reviewed BMI and weight loss as preventative health measures.   Reviewed exercise as a preventative health measures.   Preconceptual Counseling - Discussed cystic fibrosis screening, rubella screening, chicken pox immunity, folic acid supplementation and HIV screening.   RTC in 1 year or PRN with problems  Return in about 1 year (around 5/16/2025) for Annual physical.    Mar Qureshi, MARQUEZ  05/16/2024

## 2024-05-21 LAB — REF LAB TEST METHOD: NORMAL

## 2024-07-28 DIAGNOSIS — G43.709 CHRONIC MIGRAINE WITHOUT AURA WITHOUT STATUS MIGRAINOSUS, NOT INTRACTABLE: ICD-10-CM

## 2024-07-29 RX ORDER — PROPRANOLOL HCL 60 MG
60 CAPSULE, EXTENDED RELEASE 24HR ORAL DAILY
Qty: 90 CAPSULE | Refills: 3 | Status: SHIPPED | OUTPATIENT
Start: 2024-07-29

## 2024-08-12 ENCOUNTER — TELEPHONE (OUTPATIENT)
Dept: NEUROLOGY | Facility: CLINIC | Age: 29
End: 2024-08-12

## 2024-08-12 ENCOUNTER — TELEMEDICINE (OUTPATIENT)
Dept: NEUROLOGY | Facility: CLINIC | Age: 29
End: 2024-08-12
Payer: COMMERCIAL

## 2024-08-12 DIAGNOSIS — G43.009 MIGRAINE WITHOUT AURA AND WITHOUT STATUS MIGRAINOSUS, NOT INTRACTABLE: Primary | ICD-10-CM

## 2024-08-12 DIAGNOSIS — H02.402 PTOSIS, LEFT EYELID: ICD-10-CM

## 2024-08-12 DIAGNOSIS — M26.609 TMJ (TEMPOROMANDIBULAR JOINT SYNDROME): ICD-10-CM

## 2024-08-12 PROCEDURE — 99214 OFFICE O/P EST MOD 30 MIN: CPT | Performed by: NURSE PRACTITIONER

## 2024-08-12 RX ORDER — PROPRANOLOL HYDROCHLORIDE 80 MG/1
80 CAPSULE, EXTENDED RELEASE ORAL DAILY
Qty: 90 CAPSULE | Refills: 3 | Status: SHIPPED | OUTPATIENT
Start: 2024-08-12

## 2024-08-12 RX ORDER — CYCLOBENZAPRINE HCL 5 MG
5 TABLET ORAL NIGHTLY PRN
Qty: 30 TABLET | Refills: 1 | Status: SHIPPED | OUTPATIENT
Start: 2024-08-12 | End: 2025-08-12

## 2024-08-12 RX ORDER — RIZATRIPTAN BENZOATE 10 MG/1
10 TABLET ORAL ONCE AS NEEDED
Qty: 12 TABLET | Refills: 11 | Status: SHIPPED | OUTPATIENT
Start: 2024-08-12 | End: 2024-08-12

## 2024-08-12 RX ORDER — PROPRANOLOL HYDROCHLORIDE 80 MG/1
80 CAPSULE, EXTENDED RELEASE ORAL DAILY
Qty: 90 CAPSULE | Refills: 3 | Status: SHIPPED | OUTPATIENT
Start: 2024-08-12 | End: 2024-08-12 | Stop reason: SDUPTHER

## 2024-08-12 RX ORDER — RIZATRIPTAN BENZOATE 10 MG/1
10 TABLET ORAL ONCE AS NEEDED
Qty: 12 TABLET | Refills: 11 | Status: SHIPPED | OUTPATIENT
Start: 2024-08-12

## 2024-08-12 RX ORDER — PROPRANOLOL HYDROCHLORIDE 80 MG/1
80 CAPSULE, EXTENDED RELEASE ORAL DAILY
Qty: 90 CAPSULE | Refills: 3 | Status: SHIPPED | OUTPATIENT
Start: 2024-08-12 | End: 2024-08-12

## 2024-08-12 RX ORDER — NAPROXEN 500 MG/1
500 TABLET ORAL 2 TIMES DAILY WITH MEALS
COMMUNITY

## 2024-08-12 NOTE — Clinical Note
For some reason the propranolol la 80mg daily #90 3 rf has failed to go electronically to her pharmacy 3 times now. This is an increase from 60mg. She will stop 60mg and go up to 80mg. Can you call in a verbal please. Unsure why it is failing to go. It is for migraine prevention.

## 2024-08-12 NOTE — PROGRESS NOTES
Subjective:     Patient ID: Nena Merida is a 29 y.o. female.    CC:   Chief Complaint   Patient presents with    Migraine       HPI:   History of Present Illness  This visit was completed face to face via VIDEO by Epic/Amador with verbal consent to treat obtained from patient and/or family.  Provider confirmed the patient's name and  at the start of visit. Patient and/or family confirms that they are located in [place] during visit and Provider is located in [facility] in [place], [place] during visit.    This is a 29-year-old female who is completing a 7-month video visit neurology follow-up on migraine headaches present for several years. She has had chronic left eyelid ptosis with unremarkable lab work-up. She has discussed planning for pregnancy.    She reports no recent surgeries or hospital visits.     Her headaches have not worsened, with approximately 6 to 8 migraine days a month. During these episodes, she typically takes naproxen, and if ineffective, Maxalt is taken. She continues to take propranolol LA 60 mg daily for prevention. During these migraine episodes, she misses approximately 2 days of work per month. She does not require FMLA. Her heart rate typically ranges from the upper 70s to low 80s with propranolol. Her menstrual cycles are regular. She prefers not to take many medications.     A dentist evaluated her for TMJ and prescribed steroids during episodes of TMJ. However, she does not believe the mouthguard she has been using is effective. She has previously taken Flexeril, which was effective. She requests a refill of this to use as needed.    She is actively seeking treatment from a fertility specialist. She and her  are seeking pregnancy.    She does not plan for left eyelid ptosis surgery at this time.    Prior Neuro history and workup:  Migraine headaches, which started in . Previously symptoms were worsening. Triggers have been stress, anxiety, quiet after loud  noises, sometimes heat and coolness have helped, medications, and sleeping. She has found that lights, noise, anxiety, and stress worsen her symptoms at times. She has had some slurred speech and drooping of her left eyelid associated with severe migraines. She has not necessarily noticed any pattern in her symptoms. She has been told to take Tylenol. She is currently on propranolol LA 60 mg daily for migraine prevention and has been prescribed rizatriptan to take at onset of migraines.     Positive Family history of migraines in her mother, sister, and grandmother.     Prior to propranolol LA from March to September of 2022 she had 16-17 headache days per month with 9 days being migraines. Her migraines typically last approximately 3 days, and she has 3 per month. Her other headaches are in addition to the migraines. When she has a migraine, the pain is located in the neck, and sometimes it is more on the left side & can be across her forehead starting with the left side and moving to the right side. She describes the pain as throbbing and moderate to severe. She has nausea, vomiting, photophobia, and phonophobia. The patient reports 1 episode of blurry vision and disorientation in 04/2022 prior to the worst migraine she has ever had. She has not had a dilated eye exam in years, but she did see her optometrist, who diagnosed her with ptosis of the left eyelid & did take detailed pictures of the back of her eye and did not see swelling or other problems. The ptosis is intermittent, and it worsens with headaches. She denies any numbness, tingling, or weakness. She denies any double vision, shortness of breath, dysphagia, or weakness in the upper extremities. The patient denies any difficulty moving the eyes. Previously tried sumatriptan and did not tolerate this.     The patient reports that the worst migraine she has ever had was in early 05/2022, which lasted 4 days. She did not go to the hospital. She continued  working through this migraine.  She has gone from having chronic migraines with 16 to 17 days a month with 9 being migraine days to over the past 3 months, having just 1 migraine day and headache day total, which is a significant improvement.      MRI of the brain with and without contrast, and this was completed on 10/25/2022, and this is a normal MRI of the brain with no acute abnormalities and no abnormal contrast enhancement per my personal review, as well as radiology report, which I agree with.      She has been evaluated by [facility], and they felt that the ptosis of her eyelid was likely a mechanical ptosis. She does follow with Dr. Robertson. Left eyelid ptosis. Recommended for repair and waiting for now.     The following portions of the patient's history were reviewed and updated as appropriate: allergies, current medications, past family history, past medical history, past social history, past surgical history, and problem list.    Past Medical History:   Diagnosis Date    Anxiety     Cluster headache 2005    Common migraine     Depression     Esophageal reflux     Headache, tension-type 2005       Past Surgical History:   Procedure Laterality Date    WISDOM TOOTH EXTRACTION         Social History     Socioeconomic History    Marital status:    Tobacco Use    Smoking status: Never    Smokeless tobacco: Never   Vaping Use    Vaping status: Never Used   Substance and Sexual Activity    Alcohol use: Yes     Alcohol/week: 2.0 - 3.0 standard drinks of alcohol    Drug use: Never    Sexual activity: Yes     Partners: Male     Birth control/protection: None     Comment: Last BC pill taken Oct 2021       Family History   Problem Relation Age of Onset    Hypertension Father     Migraines Father     Migraines Mother     Cancer Mother     Hypertension Mother     Breast cancer Mother     Migraines Sister     Diabetes Other     Hypertension Other     Migraines Other     Ovarian cancer Neg Hx      Uterine cancer Neg Hx     Colon cancer Neg Hx           Current Outpatient Medications:     propranolol LA (INDERAL LA) 80 MG 24 hr capsule, Take 1 capsule by mouth Daily. For migraine prevention, Disp: 90 capsule, Rfl: 3    rizatriptan (MAXALT) 10 MG tablet, Take 1 tablet by mouth 1 (One) Time As Needed for Migraine for up to 144 doses., Disp: 12 tablet, Rfl: 11    cetirizine (zyrTEC) 10 MG tablet, Take 1 tablet by mouth Daily., Disp: 90 tablet, Rfl: 1    cyclobenzaprine (FLEXERIL) 5 MG tablet, Take 1 tablet by mouth At Night As Needed for Muscle Spasms (TMJ)., Disp: 30 tablet, Rfl: 1    famotidine (PEPCID) 20 MG tablet, TAKE 1 TABLET BY MOUTH TWICE A DAY, Disp: 180 tablet, Rfl: 1    naproxen (NAPROSYN) 500 MG tablet, Take 1 tablet by mouth 2 (Two) Times a Day With Meals., Disp: , Rfl:     prenatal vitamin (prenatal, CLASSIC, vitamin) tablet, Take 1 tablet by mouth As Needed., Disp: , Rfl:      Review of Systems   Neurological:  Positive for headaches.        TMJ click   All other systems reviewed and are negative.       Objective:  There were no vitals taken for this visit.  Not obtained d/t video visit    Neurological Exam  Mental Status  Alert. Oriented to person, place, time and situation. Recent and remote memory are intact. Speech is normal. Language is fluent with no aphasia. Attention and concentration are normal. Fund of knowledge is appropriate for level of education.    Cranial Nerves  CN II: Visual acuity is normal. Mildly decreased left eye d/t baseline ptosis.  CN III, IV, VI: Extraocular movements intact bilaterally. Left ptosis. Pupils equal round and reactive to light bilaterally.  CN VII: Full and symmetric facial movement.    Physical Exam  Constitutional:       Appearance: Normal appearance.   Eyes:      Extraocular Movements: Extraocular movements intact.      Pupils: Pupils are equal, round, and reactive to light.   Neurological:      Mental Status: She is alert.   Psychiatric:         Mood  and Affect: Mood and affect normal.         Speech: Speech normal.       Results:  Results  None    Assessment/Plan:     Diagnoses and all orders for this visit:    1. Migraine without aura and without status migrainosus, not intractable (Primary)  -     Discontinue: propranolol LA (INDERAL LA) 80 MG 24 hr capsule; Take 1 capsule by mouth Daily. For migraine prevention  Dispense: 90 capsule; Refill: 3  -     Discontinue: propranolol LA (INDERAL LA) 80 MG 24 hr capsule; Take 1 capsule by mouth Daily. For migraine prevention  Dispense: 90 capsule; Refill: 3  -     rizatriptan (MAXALT) 10 MG tablet; Take 1 tablet by mouth 1 (One) Time As Needed for Migraine for up to 144 doses.  Dispense: 12 tablet; Refill: 11  -     Discontinue: propranolol LA (INDERAL LA) 80 MG 24 hr capsule; Take 1 capsule by mouth Daily. For migraine prevention  Dispense: 90 capsule; Refill: 3  -     propranolol LA (INDERAL LA) 80 MG 24 hr capsule; Take 1 capsule by mouth Daily. For migraine prevention  Dispense: 90 capsule; Refill: 3    2. Ptosis, left eyelid  Comments:  stable, monitor    3. TMJ (temporomandibular joint syndrome)  -     cyclobenzaprine (FLEXERIL) 5 MG tablet; Take 1 tablet by mouth At Night As Needed for Muscle Spasms (TMJ).  Dispense: 30 tablet; Refill: 1    Other orders  -     Discontinue: rizatriptan (MAXALT) 10 MG tablet; Take 1 tablet by mouth 1 (One) Time As Needed for Migraine for up to 144 doses.  Dispense: 12 tablet; Refill: 11           Assessment & Plan  Temporomandibular joint disorder. She had dental evaluation with dental specialist and was noted to have some TMJ. She will continue her mouthguard at night and I will send in some Flexeril just if needed for TMJ flareups. She is actively trying to conceive.  Anti-CGRP monthly injections would be contraindicated with her long half-life requiring to discontinue these medications 5 to 6 months prior to pregnancy.  I did discuss the option of using Qulipta daily or Nurtec  every other day for migraine prevention, this would be stopped 72 hours before ovulation, however she is not certain when she ovulates and opted not to try this option at the present time. She will continue the propranolol with increased dose to 80 mg daily for migraine prevention. She will continue rizatriptan as needed. She verbalizes understanding and agrees with plan moving forward today.  If she does indeed become pregnant, she will need to discuss her medications with her OB provider for safety throughout pregnancy.  She verbalizes understanding and agrees with plan today.    Follow-up  She will follow up in 7 months via telehealth or sooner if needed.    Reviewed medications, potential side effects and signs and symptoms to report. Discussed risk versus benefits of treatment plan with patient and/or family-including medications, labs and radiology that may be ordered. Addressed questions and concerns during visit. Patient and/or family verbalized understanding and agree with plan.    During this visit the following were done:  Labs Reviewed []    Labs Ordered []    Radiology Reports Reviewed []    Radiology Ordered []    PCP Records Reviewed [x]    Referring Provider Records Reviewed []    ER Records Reviewed []    Hospital Records Reviewed []    History Obtained From Family []    Radiology Images Reviewed []    Other Reviewed [x]  OB/GYN notes  Records Requested []      08/12/24   13:30 EDT    Patient or patient representative verbalized consent for the use of Ambient Listening during the visit with  MARQUEZ Obando for chart documentation. 8/12/2024  14:44 EDT    Note to patient: The 21st Century Cures Act makes medical notes like these available to patients in the interest of transparency. However, be advised this is a medical document. It is intended as peer to peer communication. It is written in medical language and may contain abbreviations or verbiage that are unfamiliar. It may appear blunt  or direct. Medical documents are intended to carry relevant information, facts as evident, and the clinical opinion of the provider.

## 2024-08-12 NOTE — TELEPHONE ENCOUNTER
----- Message from Loree Saleh sent at 8/12/2024  1:44 PM EDT -----  For some reason the propranolol la 80mg daily #90 3 rf has failed to go electronically to her pharmacy 3 times now. This is an increase from 60mg. She will stop 60mg and go up to 80mg. Can you call in a verbal please. Unsure why it is failing to go. It is for migraine prevention.

## 2024-08-12 NOTE — LETTER
2024     Jose Angel Ortiz MD  [address] [place] [address]    Patient: Nena Merida   YOB: 1995   Date of Visit: 2024     Dear Jose Angel Ortiz MD:       Thank you for referring Nena Merida to me for evaluation. Below are the relevant portions of my assessment and plan of care.    If you have questions, please do not hesitate to call me. I look forward to following Nena along with you.         Sincerely,        MARQUEZ Obando        CC: No Recipients    Loree Saleh APRN  [phone]  Sign when Signing Visit  Subjective:     Patient ID: Nena Merida is a 29 y.o. female.    CC:   Chief Complaint   Patient presents with   • Migraine       HPI:   History of Present Illness  This visit was completed face to face via VIDEO by Epic/Twilio with verbal consent to treat obtained from patient and/or family.  Provider confirmed the patient's name and  at the start of visit. Patient and/or family confirms that they are located in [place] during visit and Provider is located in [facility] in [place], [place] during visit.    This is a 29-year-old female who is completing a 7-month video visit neurology follow-up on migraine headaches present for several years. She has had chronic left eyelid ptosis with unremarkable lab work-up. She has discussed planning for pregnancy.    She reports no recent surgeries or hospital visits.     Her headaches have not worsened, with approximately 6 to 8 migraine days a month. During these episodes, she typically takes naproxen, and if ineffective, Maxalt is taken. She continues to take propranolol LA 60 mg daily for prevention. During these migraine episodes, she misses approximately 2 days of work per month. She does not require FMLA. Her heart rate typically ranges from the upper 70s to low 80s with propranolol. Her menstrual cycles are regular. She prefers not to take many medications.     A dentist  evaluated her for TMJ and prescribed steroids during episodes of TMJ. However, she does not believe the mouthguard she has been using is effective. She has previously taken Flexeril, which was effective. She requests a refill of this to use as needed.    She is actively seeking treatment from a fertility specialist. She and her  are seeking pregnancy.    She does not plan for left eyelid ptosis surgery at this time.    Prior Neuro history and workup:  Migraine headaches, which started in 2005. Previously symptoms were worsening. Triggers have been stress, anxiety, quiet after loud noises, sometimes heat and coolness have helped, medications, and sleeping. She has found that lights, noise, anxiety, and stress worsen her symptoms at times. She has had some slurred speech and drooping of her left eyelid associated with severe migraines. She has not necessarily noticed any pattern in her symptoms. She has been told to take Tylenol. She is currently on propranolol LA 60 mg daily for migraine prevention and has been prescribed rizatriptan to take at onset of migraines.     Positive Family history of migraines in her mother, sister, and grandmother.     Prior to propranolol LA from March to September of 2022 she had 16-17 headache days per month with 9 days being migraines. Her migraines typically last approximately 3 days, and she has 3 per month. Her other headaches are in addition to the migraines. When she has a migraine, the pain is located in the neck, and sometimes it is more on the left side & can be across her forehead starting with the left side and moving to the right side. She describes the pain as throbbing and moderate to severe. She has nausea, vomiting, photophobia, and phonophobia. The patient reports 1 episode of blurry vision and disorientation in 04/2022 prior to the worst migraine she has ever had. She has not had a dilated eye exam in years, but she did see her optometrist, who diagnosed her  with ptosis of the left eyelid & did take detailed pictures of the back of her eye and did not see swelling or other problems. The ptosis is intermittent, and it worsens with headaches. She denies any numbness, tingling, or weakness. She denies any double vision, shortness of breath, dysphagia, or weakness in the upper extremities. The patient denies any difficulty moving the eyes. Previously tried sumatriptan and did not tolerate this.     The patient reports that the worst migraine she has ever had was in early 05/2022, which lasted 4 days. She did not go to the hospital. She continued working through this migraine.  She has gone from having chronic migraines with 16 to 17 days a month with 9 being migraine days to over the past 3 months, having just 1 migraine day and headache day total, which is a significant improvement.      MRI of the brain with and without contrast, and this was completed on 10/25/2022, and this is a normal MRI of the brain with no acute abnormalities and no abnormal contrast enhancement per my personal review, as well as radiology report, which I agree with.      She has been evaluated by [facility], and they felt that the ptosis of her eyelid was likely a mechanical ptosis. She does follow with Dr. Robertson. Left eyelid ptosis. Recommended for repair and waiting for now.     The following portions of the patient's history were reviewed and updated as appropriate: allergies, current medications, past family history, past medical history, past social history, past surgical history, and problem list.    Past Medical History:   Diagnosis Date   • Anxiety    • Cluster headache 2005   • Common migraine    • Depression    • Esophageal reflux    • Headache, tension-type 2005       Past Surgical History:   Procedure Laterality Date   • WISDOM TOOTH EXTRACTION         Social History     Socioeconomic History   • Marital status:    Tobacco Use   • Smoking status: Never   •  Smokeless tobacco: Never   Vaping Use   • Vaping status: Never Used   Substance and Sexual Activity   • Alcohol use: Yes     Alcohol/week: 2.0 - 3.0 standard drinks of alcohol   • Drug use: Never   • Sexual activity: Yes     Partners: Male     Birth control/protection: None     Comment: Last BC pill taken Oct 2021       Family History   Problem Relation Age of Onset   • Hypertension Father    • Migraines Father    • Migraines Mother    • Cancer Mother    • Hypertension Mother    • Breast cancer Mother    • Migraines Sister    • Diabetes Other    • Hypertension Other    • Migraines Other    • Ovarian cancer Neg Hx    • Uterine cancer Neg Hx    • Colon cancer Neg Hx           Current Outpatient Medications:   •  propranolol LA (INDERAL LA) 80 MG 24 hr capsule, Take 1 capsule by mouth Daily. For migraine prevention, Disp: 90 capsule, Rfl: 3  •  rizatriptan (MAXALT) 10 MG tablet, Take 1 tablet by mouth 1 (One) Time As Needed for Migraine for up to 144 doses., Disp: 12 tablet, Rfl: 11  •  cetirizine (zyrTEC) 10 MG tablet, Take 1 tablet by mouth Daily., Disp: 90 tablet, Rfl: 1  •  cyclobenzaprine (FLEXERIL) 5 MG tablet, Take 1 tablet by mouth At Night As Needed for Muscle Spasms (TMJ)., Disp: 30 tablet, Rfl: 1  •  famotidine (PEPCID) 20 MG tablet, TAKE 1 TABLET BY MOUTH TWICE A DAY, Disp: 180 tablet, Rfl: 1  •  naproxen (NAPROSYN) 500 MG tablet, Take 1 tablet by mouth 2 (Two) Times a Day With Meals., Disp: , Rfl:   •  prenatal vitamin (prenatal, CLASSIC, vitamin) tablet, Take 1 tablet by mouth As Needed., Disp: , Rfl:      Review of Systems   Neurological:  Positive for headaches.        TMJ click   All other systems reviewed and are negative.       Objective:  There were no vitals taken for this visit.  Not obtained d/t video visit    Neurological Exam  Mental Status  Alert. Oriented to person, place, time and situation. Recent and remote memory are intact. Speech is normal. Language is fluent with no aphasia. Attention  and concentration are normal. Fund of knowledge is appropriate for level of education.    Cranial Nerves  CN II: Visual acuity is normal. Mildly decreased left eye d/t baseline ptosis.  CN III, IV, VI: Extraocular movements intact bilaterally. Left ptosis. Pupils equal round and reactive to light bilaterally.  CN VII: Full and symmetric facial movement.    Physical Exam  Constitutional:       Appearance: Normal appearance.   Eyes:      Extraocular Movements: Extraocular movements intact.      Pupils: Pupils are equal, round, and reactive to light.   Neurological:      Mental Status: She is alert.   Psychiatric:         Mood and Affect: Mood and affect normal.         Speech: Speech normal.       Results:  Results  None    Assessment/Plan:     Diagnoses and all orders for this visit:    1. Migraine without aura and without status migrainosus, not intractable (Primary)  -     Discontinue: propranolol LA (INDERAL LA) 80 MG 24 hr capsule; Take 1 capsule by mouth Daily. For migraine prevention  Dispense: 90 capsule; Refill: 3  -     Discontinue: propranolol LA (INDERAL LA) 80 MG 24 hr capsule; Take 1 capsule by mouth Daily. For migraine prevention  Dispense: 90 capsule; Refill: 3  -     rizatriptan (MAXALT) 10 MG tablet; Take 1 tablet by mouth 1 (One) Time As Needed for Migraine for up to 144 doses.  Dispense: 12 tablet; Refill: 11  -     Discontinue: propranolol LA (INDERAL LA) 80 MG 24 hr capsule; Take 1 capsule by mouth Daily. For migraine prevention  Dispense: 90 capsule; Refill: 3  -     propranolol LA (INDERAL LA) 80 MG 24 hr capsule; Take 1 capsule by mouth Daily. For migraine prevention  Dispense: 90 capsule; Refill: 3    2. Ptosis, left eyelid  Comments:  stable, monitor    3. TMJ (temporomandibular joint syndrome)  -     cyclobenzaprine (FLEXERIL) 5 MG tablet; Take 1 tablet by mouth At Night As Needed for Muscle Spasms (TMJ).  Dispense: 30 tablet; Refill: 1    Other orders  -     Discontinue: rizatriptan  (MAXALT) 10 MG tablet; Take 1 tablet by mouth 1 (One) Time As Needed for Migraine for up to 144 doses.  Dispense: 12 tablet; Refill: 11           Assessment & Plan  Temporomandibular joint disorder. She had dental evaluation with dental specialist and was noted to have some TMJ. She will continue her mouthguard at night and I will send in some Flexeril just if needed for TMJ flareups. She is actively trying to conceive.  Anti-CGRP monthly injections would be contraindicated with her long half-life requiring to discontinue these medications 5 to 6 months prior to pregnancy.  I did discuss the option of using Qulipta daily or Nurtec every other day for migraine prevention, this would be stopped 72 hours before ovulation, however she is not certain when she ovulates and opted not to try this option at the present time. She will continue the propranolol with increased dose to 80 mg daily for migraine prevention. She will continue rizatriptan as needed. She verbalizes understanding and agrees with plan moving forward today.  If she does indeed become pregnant, she will need to discuss her medications with her OB provider for safety throughout pregnancy.  She verbalizes understanding and agrees with plan today.    Follow-up  She will follow up in 7 months via telehealth or sooner if needed.    Reviewed medications, potential side effects and signs and symptoms to report. Discussed risk versus benefits of treatment plan with patient and/or family-including medications, labs and radiology that may be ordered. Addressed questions and concerns during visit. Patient and/or family verbalized understanding and agree with plan.    During this visit the following were done:  Labs Reviewed []    Labs Ordered []    Radiology Reports Reviewed []    Radiology Ordered []    PCP Records Reviewed [x]    Referring Provider Records Reviewed []    ER Records Reviewed []    Hospital Records Reviewed []    History Obtained From Family []     Radiology Images Reviewed []    Other Reviewed [x]  OB/GYN notes  Records Requested []      08/12/24   13:30 EDT    Patient or patient representative verbalized consent for the use of Ambient Listening during the visit with  MARQUEZ Obando for chart documentation. 8/12/2024  14:44 EDT    Note to patient: The 21st Century Cures Act makes medical notes like these available to patients in the interest of transparency. However, be advised this is a medical document. It is intended as peer to peer communication. It is written in medical language and may contain abbreviations or verbiage that are unfamiliar. It may appear blunt or direct. Medical documents are intended to carry relevant information, facts as evident, and the clinical opinion of the provider.             room air

## 2025-03-24 ENCOUNTER — TELEMEDICINE (OUTPATIENT)
Dept: NEUROLOGY | Facility: CLINIC | Age: 30
End: 2025-03-24
Payer: COMMERCIAL

## 2025-03-24 DIAGNOSIS — R27.8 COORDINATION ABNORMAL: Primary | ICD-10-CM

## 2025-03-24 DIAGNOSIS — R41.842 VISUOSPATIAL DEFICIT: ICD-10-CM

## 2025-03-24 DIAGNOSIS — G43.009 MIGRAINE WITHOUT AURA AND WITHOUT STATUS MIGRAINOSUS, NOT INTRACTABLE: ICD-10-CM

## 2025-03-24 DIAGNOSIS — H02.402 PTOSIS, LEFT EYELID: ICD-10-CM

## 2025-03-24 DIAGNOSIS — M26.609 TMJ (TEMPOROMANDIBULAR JOINT SYNDROME): ICD-10-CM

## 2025-03-24 PROBLEM — R25.1 TREMOR OF RIGHT HAND: Status: ACTIVE | Noted: 2025-03-24

## 2025-03-24 PROCEDURE — 99214 OFFICE O/P EST MOD 30 MIN: CPT | Performed by: NURSE PRACTITIONER

## 2025-03-24 RX ORDER — RIZATRIPTAN BENZOATE 10 MG/1
10 TABLET ORAL ONCE AS NEEDED
Qty: 12 TABLET | Refills: 11 | Status: SHIPPED | OUTPATIENT
Start: 2025-03-24 | End: 2025-03-24 | Stop reason: SDUPTHER

## 2025-03-24 RX ORDER — PROPRANOLOL HYDROCHLORIDE 80 MG/1
80 CAPSULE, EXTENDED RELEASE ORAL DAILY
Qty: 90 CAPSULE | Refills: 3 | Status: SHIPPED | OUTPATIENT
Start: 2025-03-24 | End: 2025-03-24 | Stop reason: SDUPTHER

## 2025-03-24 RX ORDER — RIZATRIPTAN BENZOATE 10 MG/1
TABLET ORAL
Qty: 12 TABLET | Refills: 11 | Status: SHIPPED | OUTPATIENT
Start: 2025-03-24 | End: 2025-03-25 | Stop reason: SDUPTHER

## 2025-03-24 RX ORDER — PROPRANOLOL HYDROCHLORIDE 80 MG/1
80 CAPSULE, EXTENDED RELEASE ORAL DAILY
Qty: 90 CAPSULE | Refills: 3 | Status: SHIPPED | OUTPATIENT
Start: 2025-03-24

## 2025-03-24 RX ORDER — CYCLOBENZAPRINE HCL 5 MG
5 TABLET ORAL NIGHTLY PRN
Qty: 30 TABLET | Refills: 1 | Status: SHIPPED | OUTPATIENT
Start: 2025-03-24 | End: 2026-03-24

## 2025-03-24 NOTE — PROGRESS NOTES
Subjective:     Patient ID: Nena Merida is a 29 y.o. female.    CC:   Chief Complaint   Patient presents with    Migraine    Neurologic Problem     Coordination impairment and visuo-spatial dysfunction     This visit was completed face to face via VIDEO by Epic/[facility]ador with verbal consent to treat obtained from patient and/or family.  Provider confirmed the patient's name and  at the start of visit. Patient and/or family confirms that they are located in [place] during visit and Provider is located in [facility] in [place], [place] during visit.    HPI:   History of Present Illness  This is a very pleasant 29-year-old female completing an 8-month neurology follow-up via telehealth regarding episodic migraines for many years and left eyelid ptosis.    She reports no recent surgeries or hospital admissions. Her medication regimen remains unchanged, although she has discontinued naproxen.     She experienced a particularly severe headache on 2025, which she attributes to stress. During the months of 2024, 2025, and 2025, she experienced headaches approximately every couple of weeks, with one episode at the end of 2025 persisting for an entire week. She continues to take propranolol 80 mg daily and rizatriptan without any tolerance issues. She is actively trying to conceive and has been experiencing difficulty obtaining rizatriptan due to backorder issues at several local pharmacies. She has not experienced any head trauma or severe illnesses, including COVID-19, although she did have a respiratory illness around [place] , which tested negative for influenza and COVID-19.    She has been prescribed cyclobenzaprine to take as needed for her TMJ symptoms and has taken it two or three times.    Following this prolonged migraine episode, she has noticed persistent symptoms, including impaired fine motor function in her dominant right hand, which she describes as different  from her previous hemiplegic migraines. She also reports difficulty with tasks requiring precision, such as applying eyeliner and using an Apple mouse, and experiences shaking. She has not noticed any changes in her vision but has switched from contact lenses to glasses of the same prescription. She also reports tingling in her left wrist, which she initially attributed to crocheting, and difficulty drinking from a cup without spilling. She was unable to perform her usual cake decorating tasks for her sister's birthday this past weekend due to difficulty controlling the icing tube. Visuo-spatial difficulties. She reports no twitching but admits to dropping objects often.    MEDICATIONS  Current: cyclobenzaprine, propranolol, rizatriptan  Discontinued: naproxen    Prior Neuro history and workup:  Migraine headaches, which started in 2005. Previously symptoms were worsening. Triggers have been stress, anxiety, quiet after loud noises, sometimes heat and coolness have helped, medications, and sleeping. She has found that lights, noise, anxiety, and stress worsen her symptoms at times. She has had some slurred speech and drooping of her left eyelid associated with severe migraines. She has not necessarily noticed any pattern in her symptoms. She has been told to take Tylenol. She is currently on propranolol LA 60 mg daily for migraine prevention and has been prescribed rizatriptan to take at onset of migraines.     Positive Family history of migraines in her mother, sister, and grandmother.     Prior to propranolol LA from March to September of 2022 she had 16-17 headache days per month with 9 days being migraines. Her migraines typically last approximately 3 days, and she has 3 per month. Her other headaches are in addition to the migraines. When she has a migraine, the pain is located in the neck, and sometimes it is more on the left side & can be across her forehead starting with the left side and moving to the right  side. She describes the pain as throbbing and moderate to severe. She has nausea, vomiting, photophobia, and phonophobia. The patient reports 1 episode of blurry vision and disorientation in 04/2022 prior to the worst migraine she has ever had. She has not had a dilated eye exam in years, but she did see her optometrist, who diagnosed her with ptosis of the left eyelid & did take detailed pictures of the back of her eye and did not see swelling or other problems. The ptosis is intermittent, and it worsens with headaches. She denies any numbness, tingling, or weakness. She denies any double vision, shortness of breath, dysphagia, or weakness in the upper extremities. The patient denies any difficulty moving the eyes. Previously tried sumatriptan and did not tolerate this.     The patient reports that the worst migraine she has ever had was in early 05/2022, which lasted 4 days. She did not go to the hospital. She continued working through this migraine.  She has gone from having chronic migraines with 16 to 17 days a month with 9 being migraine days to over the past 3 months, having just 1 migraine day and headache day total, which is a significant improvement.      MRI of the brain with and without contrast, and this was completed on 10/25/2022, and this is a normal MRI of the brain with no acute abnormalities and no abnormal contrast enhancement per my personal review, as well as radiology report, which I agree with.      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 She does follow with Dr. Robertson. Left eyelid ptosis. Recommended for repair and waiting for now.    TMJ.  MuSK Antibody and Acetylcholine Receptor Antibody Panel negative 2022.    The following portions of the patient's history were reviewed and updated as appropriate: allergies, current medications, past family history, past medical history, past social history, past  surgical history, and problem list.    Past Medical History:   Diagnosis Date    Anxiety     Cluster headache 2005    Common migraine     Depression     Esophageal reflux     Headache, tension-type 2005       Past Surgical History:   Procedure Laterality Date    WISDOM TOOTH EXTRACTION         Social History     Socioeconomic History    Marital status:    Tobacco Use    Smoking status: Never    Smokeless tobacco: Never   Vaping Use    Vaping status: Never Used   Substance and Sexual Activity    Alcohol use: Yes     Alcohol/week: 2.0 - 3.0 standard drinks of alcohol    Drug use: Never    Sexual activity: Yes     Partners: Male     Birth control/protection: None     Comment: Last BC pill taken Oct 2021       Family History   Problem Relation Age of Onset    Hypertension Father     Migraines Father     Migraines Mother     Cancer Mother     Hypertension Mother     Breast cancer Mother     Migraines Sister     Diabetes Other     Hypertension Other     Migraines Other     Ovarian cancer Neg Hx     Uterine cancer Neg Hx     Colon cancer Neg Hx           Current Outpatient Medications:     cyclobenzaprine (FLEXERIL) 5 MG tablet, Take 1 tablet by mouth At Night As Needed for Muscle Spasms (TMJ)., Disp: 30 tablet, Rfl: 1    propranolol LA (INDERAL LA) 80 MG 24 hr capsule, Take 1 capsule by mouth Daily. For migraine prevention, Disp: 90 capsule, Rfl: 3    rizatriptan (MAXALT) 10 MG tablet, Take 1 tablet at onset of migraine as needed, may repeat in 2 hours if migraine persist, Disp: 12 tablet, Rfl: 11    cetirizine (zyrTEC) 10 MG tablet, Take 1 tablet by mouth Daily., Disp: 90 tablet, Rfl: 1    famotidine (PEPCID) 20 MG tablet, TAKE 1 TABLET BY MOUTH TWICE A DAY, Disp: 180 tablet, Rfl: 1    prenatal vitamin (prenatal, CLASSIC, vitamin) tablet, Take 1 tablet by mouth As Needed., Disp: , Rfl:      Review of Systems     Objective:  There were no vitals taken for this visit.    Neurological Exam  Mental Status  Alert.  Oriented to person, place, time and situation. Speech is normal. Language is fluent with no aphasia. Fund of knowledge is appropriate for level of education.    Cranial Nerves  CN II: Visual acuity is normal.  CN III, IV, VI: Extraocular movements intact bilaterally. Left ptosis. Chronic.  CN VII: Full and symmetric facial movement.  CN IX, X: Palate elevates symmetrically. Normal gag reflex.  CN XI: Shoulder shrug strength is normal.  CN XII: Tongue midline without atrophy or fasciculations.    Coordination  Right: Finger-to-nose abnormality: Rapid alternating movement abnormality:Left: Finger-to-nose normal. Rapid alternating movement normal.        Physical Exam  Constitutional:       Appearance: Normal appearance.   Eyes:      Extraocular Movements: Extraocular movements intact.   Neurological:      Mental Status: She is alert.   Psychiatric:         Speech: Speech normal.     Limited exam due to telehealth, but she notes difficulty with fine motor movements and coordination in the right side compared to the left    Results:  Results  None recently    Assessment/Plan:     Diagnoses and all orders for this visit:    1. Coordination abnormal (Primary)  -     MRI Brain With & Without Contrast; Future  -     MRI Cervical Spine With & Without Contrast; Future    2. Migraine without aura and without status migrainosus, not intractable  Comments:  prior hemiplegic migraine; PA for Nurtec as needed  Orders:  -     Discontinue: rizatriptan (MAXALT) 10 MG tablet; Take 1 tablet by mouth 1 (One) Time As Needed for Migraine for up to 144 doses.  Dispense: 12 tablet; Refill: 11  -     Discontinue: propranolol LA (INDERAL LA) 80 MG 24 hr capsule; Take 1 capsule by mouth Daily. For migraine prevention  Dispense: 90 capsule; Refill: 3  -     propranolol LA (INDERAL LA) 80 MG 24 hr capsule; Take 1 capsule by mouth Daily. For migraine prevention  Dispense: 90 capsule; Refill: 3  -     Discontinue: rizatriptan (MAXALT) 10 MG  tablet; Take 1 tablet by mouth 1 (One) Time As Needed for Migraine for up to 144 doses.  Dispense: 12 tablet; Refill: 11  -     rizatriptan (MAXALT) 10 MG tablet; Take 1 tablet at onset of migraine as needed, may repeat in 2 hours if migraine persist  Dispense: 12 tablet; Refill: 11    3. Visuospatial deficit  -     MRI Brain With & Without Contrast; Future  -     MRI Cervical Spine With & Without Contrast; Future    4. TMJ (temporomandibular joint syndrome)  -     cyclobenzaprine (FLEXERIL) 5 MG tablet; Take 1 tablet by mouth At Night As Needed for Muscle Spasms (TMJ).  Dispense: 30 tablet; Refill: 1    5. Ptosis, left eyelid  Comments:  stable, follow up with eye specialist           Assessment & Plan  1. Episodic migraines with new symptoms of coordination difficulties.  She is having significant difficulty with coordination and visual-spatial difficulties since her most recent 1-week migraine, concerning for a demyelinating etiology. She has had prior hemiplegic migraines, which could be associated with these symptoms. An MRI of the brain and cervical spine with and without IV contrast is recommended to evaluate for multiple sclerosis/demyelinating lesions. She is advised to get her eyes checked with her eye specialist whom she follows for left eye ptosis. Continue propranolol LA 80 mg daily for migraine prevention and rizatriptan as needed. The specialty pharmacy team will work on getting her approved for Nurtec. She is aware she can not take Nurtec once pregnant. If imaging is clear, consideration of taking aspirin 81 mg daily will be discussed.    2. TMJ symptoms.  She has been prescribed cyclobenzaprine to take as needed for her TMJ symptoms and has taken it two or three times.    3. Left eyelid ptosis.  Baseline. Updated eye exam recommended.    Follow-up  She will follow up on 09/03/2025 or sooner if needed.    Reviewed medications, potential side effects and signs and symptoms to report. Discussed risk  versus benefits of treatment plan with patient and/or family-including medications, labs and radiology that may be ordered. Addressed questions and concerns during visit. Patient and/or family verbalized understanding and agree with plan.    During this visit the following were done:  Labs Reviewed [x]    Labs Ordered []    Radiology Reports Reviewed [x]    Radiology Ordered [x]    PCP Records Reviewed []    Referring Provider Records Reviewed []    ER Records Reviewed []    Hospital Records Reviewed []    History Obtained From Family []    Radiology Images Reviewed []    Other Reviewed [x]    Records Requested []      03/24/25   10:38 EDT    Patient or patient representative verbalized consent for the use of Ambient Listening during the visit with  MARQUEZ Obando for chart documentation. 3/24/2025  12:11 EDT    Note to patient: The 21st Century Cures Act makes medical notes like these available to patients in the interest of transparency. However, be advised this is a medical document. It is intended as peer to peer communication. It is written in medical language and may contain abbreviations or verbiage that are unfamiliar. It may appear blunt or direct. Medical documents are intended to carry relevant information, facts as evident, and the clinical opinion of the provider.

## 2025-03-24 NOTE — LETTER
2025     Jose Angel Ortiz MD  [address] [place] [address]    Patient: Nena Merida   YOB: 1995   Date of Visit: 3/24/2025     Dear Jose Angel Ortiz MD:       Thank you for referring Nena Merida to me for evaluation. Below are the relevant portions of my assessment and plan of care.    If you have questions, please do not hesitate to call me. I look forward to following Nena along with you.         Sincerely,        MARQUEZ Obando        CC: MD Delfino Rodgers Ashley Moore, APRN  25 [phone]  Sign when Signing Visit  Subjective:     Patient ID: Nena Merida is a 29 y.o. female.    CC:   Chief Complaint   Patient presents with   • Migraine   • Neurologic Problem     Coordination impairment and visuo-spatial dysfunction     This visit was completed face to face via VIDEO by Epic/LightArrow with verbal consent to treat obtained from patient and/or family.  Provider confirmed the patient's name and  at the start of visit. Patient and/or family confirms that they are located in [place] during visit and Provider is located in [facility] in [place], [place] during visit.    HPI:   History of Present Illness  This is a very pleasant 29-year-old female completing an 8-month neurology follow-up via telehealth regarding episodic migraines for many years and left eyelid ptosis.    She reports no recent surgeries or hospital admissions. Her medication regimen remains unchanged, although she has discontinued naproxen.     She experienced a particularly severe headache on 2025, which she attributes to stress. During the months of 2024, 2025, and 2025, she experienced headaches approximately every couple of weeks, with one episode at the end of 2025 persisting for an entire week. She continues to take propranolol 80 mg daily and rizatriptan without any tolerance issues. She is actively trying to  conceive and has been experiencing difficulty obtaining rizatriptan due to backorder issues at several local pharmacies. She has not experienced any head trauma or severe illnesses, including COVID-19, although she did have a respiratory illness around New Year's, which tested negative for influenza and COVID-19.    She has been prescribed cyclobenzaprine to take as needed for her TMJ symptoms and has taken it two or three times.    Following this prolonged migraine episode, she has noticed persistent symptoms, including impaired fine motor function in her dominant right hand, which she describes as different from her previous hemiplegic migraines. She also reports difficulty with tasks requiring precision, such as applying eyeliner and using an Apple mouse, and experiences shaking. She has not noticed any changes in her vision but has switched from contact lenses to glasses of the same prescription. She also reports tingling in her left wrist, which she initially attributed to crocheting, and difficulty drinking from a cup without spilling. She was unable to perform her usual cake decorating tasks for her sister's birthday this past weekend due to difficulty controlling the icing tube. Visuo-spatial difficulties. She reports no twitching but admits to dropping objects often.    MEDICATIONS  Current: cyclobenzaprine, propranolol, rizatriptan  Discontinued: naproxen    Prior Neuro history and workup:  Migraine headaches, which started in 2005. Previously symptoms were worsening. Triggers have been stress, anxiety, quiet after loud noises, sometimes heat and coolness have helped, medications, and sleeping. She has found that lights, noise, anxiety, and stress worsen her symptoms at times. She has had some slurred speech and drooping of her left eyelid associated with severe migraines. She has not necessarily noticed any pattern in her symptoms. She has been told to take Tylenol. She is currently on propranolol LA 60  mg daily for migraine prevention and has been prescribed rizatriptan to take at onset of migraines.     Positive Family history of migraines in her mother, sister, and grandmother.     Prior to propranolol LA from March to September of 2022 she had 16-17 headache days per month with 9 days being migraines. Her migraines typically last approximately 3 days, and she has 3 per month. Her other headaches are in addition to the migraines. When she has a migraine, the pain is located in the neck, and sometimes it is more on the left side & can be across her forehead starting with the left side and moving to the right side. She describes the pain as throbbing and moderate to severe. She has nausea, vomiting, photophobia, and phonophobia. The patient reports 1 episode of blurry vision and disorientation in 04/2022 prior to the worst migraine she has ever had. She has not had a dilated eye exam in years, but she did see her optometrist, who diagnosed her with ptosis of the left eyelid & did take detailed pictures of the back of her eye and did not see swelling or other problems. The ptosis is intermittent, and it worsens with headaches. She denies any numbness, tingling, or weakness. She denies any double vision, shortness of breath, dysphagia, or weakness in the upper extremities. The patient denies any difficulty moving the eyes. Previously tried sumatriptan and did not tolerate this.     The patient reports that the worst migraine she has ever had was in early 05/2022, which lasted 4 days. She did not go to the hospital. She continued working through this migraine.  She has gone from having chronic migraines with 16 to 17 days a month with 9 being migraine days to over the past 3 months, having just 1 migraine day and headache day total, which is a significant improvement.      MRI of the brain with and without contrast, and this was completed on 10/25/2022, and this is a normal MRI of the brain with no acute  abnormalities and no abnormal contrast enhancement per my personal review, as well as radiology report, which I agree with.      She has been evaluated by [facility], and they felt that the ptosis of her eyelid was likely a mechanical ptosis. She does follow with Dr. Robertson. Left eyelid ptosis. Recommended for repair and waiting for now.    TMJ.  MuSK Antibody and Acetylcholine Receptor Antibody Panel negative 2022.    The following portions of the patient's history were reviewed and updated as appropriate: allergies, current medications, past family history, past medical history, past social history, past surgical history, and problem list.    Past Medical History:   Diagnosis Date   • Anxiety    • Cluster headache 2005   • Common migraine    • Depression    • Esophageal reflux    • Headache, tension-type 2005       Past Surgical History:   Procedure Laterality Date   • WISDOM TOOTH EXTRACTION         Social History     Socioeconomic History   • Marital status:    Tobacco Use   • Smoking status: Never   • Smokeless tobacco: Never   Vaping Use   • Vaping status: Never Used   Substance and Sexual Activity   • Alcohol use: Yes     Alcohol/week: 2.0 - 3.0 standard drinks of alcohol   • Drug use: Never   • Sexual activity: Yes     Partners: Male     Birth control/protection: None     Comment: Last BC pill taken Oct 2021       Family History   Problem Relation Age of Onset   • Hypertension Father    • Migraines Father    • Migraines Mother    • Cancer Mother    • Hypertension Mother    • Breast cancer Mother    • Migraines Sister    • Diabetes Other    • Hypertension Other    • Migraines Other    • Ovarian cancer Neg Hx    • Uterine cancer Neg Hx    • Colon cancer Neg Hx           Current Outpatient Medications:   •  cyclobenzaprine (FLEXERIL) 5 MG tablet, Take 1 tablet by mouth At Night As Needed for Muscle Spasms (TMJ)., Disp: 30 tablet, Rfl: 1  •  propranolol LA (INDERAL LA) 80 MG 24 hr  capsule, Take 1 capsule by mouth Daily. For migraine prevention, Disp: 90 capsule, Rfl: 3  •  rizatriptan (MAXALT) 10 MG tablet, Take 1 tablet at onset of migraine as needed, may repeat in 2 hours if migraine persist, Disp: 12 tablet, Rfl: 11  •  cetirizine (zyrTEC) 10 MG tablet, Take 1 tablet by mouth Daily., Disp: 90 tablet, Rfl: 1  •  famotidine (PEPCID) 20 MG tablet, TAKE 1 TABLET BY MOUTH TWICE A DAY, Disp: 180 tablet, Rfl: 1  •  prenatal vitamin (prenatal, CLASSIC, vitamin) tablet, Take 1 tablet by mouth As Needed., Disp: , Rfl:      Review of Systems     Objective:  There were no vitals taken for this visit.    Neurological Exam  Mental Status  Alert. Oriented to person, place, time and situation. Speech is normal. Language is fluent with no aphasia. Fund of knowledge is appropriate for level of education.    Cranial Nerves  CN II: Visual acuity is normal.  CN III, IV, VI: Extraocular movements intact bilaterally. Left ptosis. Chronic.  CN VII: Full and symmetric facial movement.  CN IX, X: Palate elevates symmetrically. Normal gag reflex.  CN XI: Shoulder shrug strength is normal.  CN XII: Tongue midline without atrophy or fasciculations.    Coordination  Right: Finger-to-nose abnormality: Rapid alternating movement abnormality:Left: Finger-to-nose normal. Rapid alternating movement normal.        Physical Exam  Constitutional:       Appearance: Normal appearance.   Eyes:      Extraocular Movements: Extraocular movements intact.   Neurological:      Mental Status: She is alert.   Psychiatric:         Speech: Speech normal.     Limited exam due to telehealth, but she notes difficulty with fine motor movements and coordination in the right side compared to the left    Results:  Results  None recently    Assessment/Plan:     Diagnoses and all orders for this visit:    1. Coordination abnormal (Primary)  -     MRI Brain With & Without Contrast; Future  -     MRI Cervical Spine With & Without Contrast;  Future    2. Migraine without aura and without status migrainosus, not intractable  Comments:  prior hemiplegic migraine; PA for Nurtec as needed  Orders:  -     Discontinue: rizatriptan (MAXALT) 10 MG tablet; Take 1 tablet by mouth 1 (One) Time As Needed for Migraine for up to 144 doses.  Dispense: 12 tablet; Refill: 11  -     Discontinue: propranolol LA (INDERAL LA) 80 MG 24 hr capsule; Take 1 capsule by mouth Daily. For migraine prevention  Dispense: 90 capsule; Refill: 3  -     propranolol LA (INDERAL LA) 80 MG 24 hr capsule; Take 1 capsule by mouth Daily. For migraine prevention  Dispense: 90 capsule; Refill: 3  -     Discontinue: rizatriptan (MAXALT) 10 MG tablet; Take 1 tablet by mouth 1 (One) Time As Needed for Migraine for up to 144 doses.  Dispense: 12 tablet; Refill: 11  -     rizatriptan (MAXALT) 10 MG tablet; Take 1 tablet at onset of migraine as needed, may repeat in 2 hours if migraine persist  Dispense: 12 tablet; Refill: 11    3. Visuospatial deficit  -     MRI Brain With & Without Contrast; Future  -     MRI Cervical Spine With & Without Contrast; Future    4. TMJ (temporomandibular joint syndrome)  -     cyclobenzaprine (FLEXERIL) 5 MG tablet; Take 1 tablet by mouth At Night As Needed for Muscle Spasms (TMJ).  Dispense: 30 tablet; Refill: 1    5. Ptosis, left eyelid  Comments:  stable, follow up with eye specialist           Assessment & Plan  1. Episodic migraines with new symptoms of coordination difficulties.  She is having significant difficulty with coordination and visual-spatial difficulties since her most recent 1-week migraine, concerning for a demyelinating etiology. She has had prior hemiplegic migraines, which could be associated with these symptoms. An MRI of the brain and cervical spine with and without IV contrast is recommended to evaluate for multiple sclerosis/demyelinating lesions. She is advised to get her eyes checked with her eye specialist whom she follows for left eye  ptosis. Continue propranolol LA 80 mg daily for migraine prevention and rizatriptan as needed. The specialty pharmacy team will work on getting her approved for Nurtec. She is aware she can not take Nurtec once pregnant. If imaging is clear, consideration of taking aspirin 81 mg daily will be discussed.    2. TMJ symptoms.  She has been prescribed cyclobenzaprine to take as needed for her TMJ symptoms and has taken it two or three times.    3. Left eyelid ptosis.  Baseline. Updated eye exam recommended.    Follow-up  She will follow up on 09/03/2025 or sooner if needed.    Reviewed medications, potential side effects and signs and symptoms to report. Discussed risk versus benefits of treatment plan with patient and/or family-including medications, labs and radiology that may be ordered. Addressed questions and concerns during visit. Patient and/or family verbalized understanding and agree with plan.    During this visit the following were done:  Labs Reviewed [x]    Labs Ordered []    Radiology Reports Reviewed [x]    Radiology Ordered [x]    PCP Records Reviewed []    Referring Provider Records Reviewed []    ER Records Reviewed []    Hospital Records Reviewed []    History Obtained From Family []    Radiology Images Reviewed []    Other Reviewed [x]    Records Requested []      03/24/25   10:38 EDT    Patient or patient representative verbalized consent for the use of Ambient Listening during the visit with  MARQUEZ Obando for chart documentation. 3/24/2025  12:11 EDT    Note to patient: The 21st Century Cures Act makes medical notes like these available to patients in the interest of transparency. However, be advised this is a medical document. It is intended as peer to peer communication. It is written in medical language and may contain abbreviations or verbiage that are unfamiliar. It may appear blunt or direct. Medical documents are intended to carry relevant information, facts as evident, and the  clinical opinion of the provider.

## 2025-03-25 ENCOUNTER — SPECIALTY PHARMACY (OUTPATIENT)
Dept: GENERAL RADIOLOGY | Facility: HOSPITAL | Age: 30
End: 2025-03-25
Payer: COMMERCIAL

## 2025-03-25 DIAGNOSIS — G43.009 MIGRAINE WITHOUT AURA AND WITHOUT STATUS MIGRAINOSUS, NOT INTRACTABLE: ICD-10-CM

## 2025-03-25 RX ORDER — RIZATRIPTAN BENZOATE 10 MG/1
TABLET ORAL
Qty: 12 TABLET | Refills: 11 | Status: SHIPPED | OUTPATIENT
Start: 2025-03-25

## 2025-03-25 NOTE — PROGRESS NOTES
Specialty Pharmacy Patient Management Program  Neurology Initial Assessment     Nena Merida is a 29 y.o. female with Chronic Migraine seen by a Neurology provider and enrolled in the Neurology Patient Management program offered by [facility].  An initial outreach was conducted, including assessment of therapy appropriateness and specialty medication education for Nurtec PRN. The patient was introduced to services offered by [facility], including: regular assessments, refill coordination, curbside pick-up or mail order delivery options, prior authorization maintenance, and financial assistance programs as applicable. The patient was also provided with contact information for the pharmacy team.     Insurance Coverage & Financial Support  [employer] + [employer] copay card    Relevant Past Medical History and Comorbidities  Relevant medical history and concomitant health conditions were discussed with the patient. The patient's chart has been reviewed for relevant past medical history and comorbid health conditions and updated as necessary.   Past Medical History:   Diagnosis Date    Anxiety     Cluster headache 2005    Common migraine     Depression     Esophageal reflux     Headache, tension-type 2005     Social History     Socioeconomic History    Marital status:    Tobacco Use    Smoking status: Never    Smokeless tobacco: Never   Vaping Use    Vaping status: Never Used   Substance and Sexual Activity    Alcohol use: Yes     Alcohol/week: 2.0 - 3.0 standard drinks of alcohol    Drug use: Never    Sexual activity: Yes     Partners: Male     Birth control/protection: None     Comment: Last BC pill taken Oct 2021     Problem list reviewed by Herbie Swain, PharmD on 3/25/2025 at  2:43 PM    Allergies  Known allergies and reactions were discussed with the patient. The patient's chart has been reviewed for  allergy information and updated as necessary.    Allergies   Allergen Reactions    Sulfa Antibiotics Unknown (See Comments)     From childhood     Allergies reviewed by Herbie Swain, Mario on 3/25/2025 at  2:43 PM    Relevant Laboratory Values      Lab Assessment  N/A.     Current Medication List  This medication list has been reviewed with the patient and evaluated for any interactions or necessary modifications/recommendations, and updated to include all prescription medications, OTC medications, and supplements the patient is currently taking.  This list reflects what is contained in the patient's profile, which has also been marked as reviewed to communicate to other providers it is the most up to date version of the patient's current medication therapy.     Current Outpatient Medications:     rizatriptan (MAXALT) 10 MG tablet, Take 1 tablet at onset of migraine as needed, may repeat in 2 hours if migraine persist, Disp: 12 tablet, Rfl: 11    cetirizine (zyrTEC) 10 MG tablet, Take 1 tablet by mouth Daily., Disp: 90 tablet, Rfl: 1    cyclobenzaprine (FLEXERIL) 5 MG tablet, Take 1 tablet by mouth At Night As Needed for Muscle Spasms (TMJ)., Disp: 30 tablet, Rfl: 1    famotidine (PEPCID) 20 MG tablet, TAKE 1 TABLET BY MOUTH TWICE A DAY, Disp: 180 tablet, Rfl: 1    prenatal vitamin (prenatal, CLASSIC, vitamin) tablet, Take 1 tablet by mouth As Needed., Disp: , Rfl:     propranolol LA (INDERAL LA) 80 MG 24 hr capsule, Take 1 capsule by mouth Daily. For migraine prevention, Disp: 90 capsule, Rfl: 3    rimegepant sulfate ODT (Nurtec) 75 MG disintegrating tablet, Place 1 tablet under the tongue As Needed  at the onset of headache, Max: 1 tab/24 hours, Max of 18 tabs/30 days., Disp: 16 tablet, Rfl: 11    Medicines reviewed by Herbie Swain, PharmD on 3/25/2025 at  2:43 PM    Drug Interactions  None     Initial Education Provided for Specialty Medication  The patient has been provided with the following education and any applicable administration techniques  (i.e. self-injection) have been demonstrated for the therapies indicated. All questions and concerns have been addressed prior to the patient receiving the medication, and the patient has verbalized understanding of the education and any materials provided.  Additional patient education shall be provided and documented upon request by the patient, provider or payer.      Nurtec (rimegepant)  Medication Expectations   Why am I taking this medication? You are taking this medication for migraine prophylaxis or to treat an acute migraine.   What should I expect while on this medication? You should expect to see a decrease in the frequency and severity of your migraines.   How does the medication work? Nurtec is a monoclonal antibody that binds to calcitonin gene-related peptide (CGRP) and blocks its binding to the receptor decreasing the severity of migraines.   How long will I be on this medication for? The amount of time you will be on this medication will be determined by your doctor and your response to the medication.    How do I take this medication? Take as directed on your prescription label.   What are some possible side effects? Potential side effects including, but not limited to nausea. Pt verbalized understanding.   What happens if I miss a dose? Take the missed dose as soon as possible, and resume the every other day timed from the last dose..     Medication Safety   What are things I should warn my doctor immediately about? Hypersensitivity reactions - trouble breathing or swallowing.   What are things that I should be cautious of? Hypersensitivity reactions (eg, dyspnea, rash), including delayed serious reactions, have occurred; discontinue use if suspected    What are some medications that can interact with this one? Avoid concomitant administration of Nurtec ODT with strong inhibitors of CY, strong or moderate inducers of CYP3A or inhibitors of P-gp or BCRP. Avoid another dose of Nurtec ODT within  48 hours when it is administered with moderate inhibitors of CY.  Ask your pharmacist or health care provider before starting new medications     Medication Storage/Handling   How should I handle this medication? Keep this medication out of reach of pets/children in original container. Ensure hands are dry before opening blister pack.   How does this medication need to be stored? Store at room temperature away from heat/cold, sunlight or moisture   How should I dispose of this medication? There should not be a need to dispose of this medication unless your provider decides to change the dose or therapy. If that is the case, take to your local police station for proper disposal. Some pharmacies also have take-back bins for medication drop-off.      Resources/Support   How can I remind myself to take this medication? You can download reminder apps to help you manage your refills. You may also set an alarm on your phone to remind you. The pharmacy carries pill boxes that you can place next to an area you pass everyday (such as where you place your car keys or where you charge your phone)   Is financial support available?  Yes, [employer] can provide co-pay cards if you have commercial insurance or patient assistance if you have Medicare or no insurance.    Which vaccines are recommended for me? Talk to your doctor about these vaccines: Flu, Coronavirus (COVID-19), Pneumococcal (pneumonia), Tdap, Hepatitis B, Zoster (shingles)       Enter Specific Medication SmartPhrase Here    Adherence and Self-Administration  Adherence related to the patient's specialty therapy was discussed with the patient. The Adherence segment of this outreach has been reviewed and updated.   Is there a concern with patient's ability to self administer the medication correctly and without issue?: No  Were any potential barriers to adherence identified during the initial assessment or patient education?: No  Are there any  concerns regarding the patient's understanding of the importance of medication adherence?: No  Methods for Supporting Patient Adherence and/or Self-Administration: Not required.    Goals of Therapy  Goals related to the patient's specialty therapy were discussed with the patient. The Patient Goals segment of this outreach has been reviewed and updated.   Goals Addressed Today        Specialty Pharmacy General Goal      Decrease frequency, severity and duration of migraine attacks.    On Average, Reduce:   Frequency of migraines to 5 per month.   Symptom severity by 50% within 1 hour of taking acute therapy.   Duration of migraines to <1 day    Baseline Values/Notes on Enrollment  Frequency: 16-17/30 headache days/month, 9+/30 migraine days/month  Symptom Severity: Severe  Duration: 3+ days    Date of Reassessment Notes on Progress Toward Above Goals   MM/DD/YY                                                         Reassessment Plan & Follow-Up  Medication Therapy Changes: New start Nurtec 75mg- take 1 tab po prn onset of migraine (max 1 tab/day). Also sending Rizatriptan.  Related Plans, Therapy Recommendations, or Therapy Problems to Be Addressed: The patient is to start Nurtec as needed for treatment of migraines. I recommended she take 1 tab at the onset of of a headache, max of 1 tablet per day. Nurtec may cause some GI upset, nausea, and abdominal pain. I advised her to take with food if needed. I also recommended she take the Rizatriptan at least 1-2 hours after taking Nurtec if the headache was not fully treated with the Nurtec. Pt acknowledged. The patient will also fill Rizatriptan through [facility] and she will refill accordingly through [facility]. We discussed the mail out process and we'll fill the medications through [facility] and ship via [employer] next day air. Nurtec & Rizatriptan- Ship Wednesday 3/25/25, deliver Thursday 3/26/25 to HOME address. Copay=$5, pt gave verbal permission to use [employer] [id number].    Pharmacist to perform regular reassessments no more than (6) months from the previous assessment.  Care Coordinator to set up future refill outreaches, coordinate prescription delivery, and escalate clinical questions to pharmacist.   Welcome information and patient satisfaction survey to be sent by specialty pharmacy team with patient's initial fill.    Attestation  Therapeutic appropriateness: Appropriate   I attest the patient was actively involved in and has agreed to the above plan of care. If the prescribed therapy is at any point deemed not appropriate based on the current or future assessments, a consultation will be initiated with the patient's specialty care provider to determine the best course of action. The revised plan of therapy will be documented along with any additional patient education provided. Discussed aforementioned material with patient via telemedicine.    Herbie Swain, Mario  Clinic Specialty Pharmacist, Neurology  3/25/2025  14:45 EDT

## 2025-04-11 ENCOUNTER — SPECIALTY PHARMACY (OUTPATIENT)
Dept: NEUROLOGY | Facility: CLINIC | Age: 30
End: 2025-04-11
Payer: COMMERCIAL

## 2025-04-17 ENCOUNTER — SPECIALTY PHARMACY (OUTPATIENT)
Dept: NEUROLOGY | Facility: CLINIC | Age: 30
End: 2025-04-17
Payer: COMMERCIAL

## 2025-04-17 NOTE — PROGRESS NOTES
Specialty Pharmacy Patient Management Program  Refill Outreach     Nena was contacted today regarding refills of their Nurtec medication(s).    Refill Questions      Flowsheet Row Most Recent Value   Changes to allergies? No   Changes to medications? No   New conditions or infections since last clinic visit No   Unplanned office visit, urgent care, ED, or hospital admission in the last 4 weeks  No   How does patient/caregiver feel medication is working? Very good   Financial problems or insurance changes  No   Since the previous refill, were any specialty medication doses or scheduled injections missed or delayed?  No   Does this patient require a clinical escalation to a pharmacist? No            Delivery Questions      Flowsheet Row Most Recent Value   Delivery method UPS   Delivery address verified with patient/caregiver? Yes   Delivery address Home   Other address preferred n/a   Number of medications in delivery 1   Medication(s) being filled and delivered Rimegepant Sulfate (NURTEC-ODT)   Doses left of specialty medications 6   Copay verified? Yes   Copay amount 0   Copay form of payment No copayment ($0)   Delivery Date Selection 04/22/25   Signature Required No   Do you consent to receive electronic handouts?  Yes                 Follow-up: 30 day(s)     Kandi Hunter, Pharmacy Technician  4/17/2025  09:01 EDT

## 2025-04-21 ENCOUNTER — HOSPITAL ENCOUNTER (OUTPATIENT)
Dept: MRI IMAGING | Facility: HOSPITAL | Age: 30
Discharge: HOME | End: 2025-04-21
Payer: COMMERCIAL

## 2025-04-21 DIAGNOSIS — R41.842 VISUOSPATIAL DEFICIT: ICD-10-CM

## 2025-04-21 DIAGNOSIS — R27.8 COORDINATION ABNORMAL: ICD-10-CM

## 2025-04-21 PROCEDURE — 70553 MRI BRAIN STEM W/O & W/DYE: CPT

## 2025-04-21 PROCEDURE — 72156 MRI NECK SPINE W/O & W/DYE: CPT

## 2025-04-21 PROCEDURE — A9577 INJ MULTIHANCE: HCPCS | Performed by: NURSE PRACTITIONER

## 2025-04-21 PROCEDURE — 25510000002 GADOBENATE DIMEGLUMINE 529 MG/ML SOLUTION: Performed by: NURSE PRACTITIONER

## 2025-04-21 RX ADMIN — GADOBENATE DIMEGLUMINE 20 ML: 529 INJECTION, SOLUTION INTRAVENOUS at 13:55

## 2025-04-24 ENCOUNTER — RESULTS FOLLOW-UP (OUTPATIENT)
Dept: NEUROLOGY | Facility: CLINIC | Age: 30
End: 2025-04-24
Payer: COMMERCIAL

## 2025-04-24 NOTE — PROGRESS NOTES
Nena, your MRI of the brain is normal. Your MRI of the cervical spine some very minor disc bulging. No evidence of lesions, multiple sclerosis or any narrowing of the spine. No findings to explain current symptoms. We will follow up as scheduled in clinic. Thanks, MARQUEZ Veliz  
Nena, your MRI of the brain is normal. Your MRI of the cervical spine some very minor disc bulging. No evidence of lesions, multiple sclerosis or any narrowing of the spine. No findings to explain current symptoms. We will follow up as scheduled in clinic. Thanks, MARQUEZ Veliz  
patient

## 2025-05-22 ENCOUNTER — OFFICE VISIT (OUTPATIENT)
Dept: OBSTETRICS AND GYNECOLOGY | Facility: CLINIC | Age: 30
End: 2025-05-22
Payer: COMMERCIAL

## 2025-05-22 VITALS
WEIGHT: 246 LBS | BODY MASS INDEX: 49.59 KG/M2 | SYSTOLIC BLOOD PRESSURE: 132 MMHG | HEIGHT: 59 IN | DIASTOLIC BLOOD PRESSURE: 88 MMHG

## 2025-05-22 DIAGNOSIS — Z01.419 WOMEN'S ANNUAL ROUTINE GYNECOLOGICAL EXAMINATION: Primary | ICD-10-CM

## 2025-05-22 NOTE — PROGRESS NOTES
Gynecologic Annual Exam Note        Gynecologic Exam (Annual )        Subjective     HPI  Nena Merida is a 30 y.o.  female who presents for annual well woman exam as an established patient. There were no changes to her medical or surgical history since her last visit. Patient's last menstrual period was 2025 (exact date). Her periods occur every 32 days, lasting 3-4 days.  The flow is light. She reports dysmenorrhea is moderate occurring first 1-2 days of flow. Marital Status: .  She is sexually active. She has not had new partners. STD testing recommendations have been explained to the patient and she does not desire STD testing.    The patient would like to discuss the following complaints today: she has been TTC for 3.5 yrs without success.  Her partner has been tested and count is low and discolored.  It was recommended that he stop soaking in hot tubs and repeat in 8 weeks. He has been resistant of repeating his semen analysis. He has stopped soaking in hot tubs as frequently.   She is using ovulation predictor kits, starts testing around CD 12-16 as that's when tatiana tells her to. She sometimes gets darkening lines, but not usually getting positives.     Additional OB/GYN History   contraceptive methods: None  Desires to: do not start contraception  Thromboembolic Disease: none  History of migraines: yes without aura  Age of menarche: 10    History of STD: no    Last Pap : 2024. Results: negative. HPV: negative.   Last Completed Pap Smear            Upcoming       PAP SMEAR (Every 3 Years) Next due on 2024  LIQUID-BASED PAP SMEAR WITH HPV GENOTYPING REGARDLESS OF INTERPRETATION (MIKAELA,COR,MAD)                             History of abnormal Pap smear: no  Gardasil status:declines  Family history of uterine, colon, breast, or ovarian cancer: yes - pt mother has hx breast cancer  Performs monthly Self-Breast Exam: sometimes  Exercises  Regularly:sometimes  Feelings of Anxiety or Depression: no  Tobacco Usage?: No       Current Outpatient Medications:     cetirizine (zyrTEC) 10 MG tablet, Take 1 tablet by mouth Daily., Disp: 90 tablet, Rfl: 1    cyclobenzaprine (FLEXERIL) 5 MG tablet, Take 1 tablet by mouth At Night As Needed for Muscle Spasms (TMJ)., Disp: 30 tablet, Rfl: 1    famotidine (PEPCID) 20 MG tablet, TAKE 1 TABLET BY MOUTH TWICE A DAY, Disp: 180 tablet, Rfl: 1    prenatal vitamin (prenatal, CLASSIC, vitamin) tablet, Take 1 tablet by mouth As Needed., Disp: , Rfl:     propranolol LA (INDERAL LA) 80 MG 24 hr capsule, Take 1 capsule by mouth Daily. For migraine prevention, Disp: 90 capsule, Rfl: 3    rimegepant sulfate ODT (Nurtec) 75 MG disintegrating tablet, Place 1 tablet under the tongue As Needed  at the onset of headache, Max: 1 tab/24 hours, Max of 18 tabs/30 days., Disp: 16 tablet, Rfl: 11    rizatriptan (MAXALT) 10 MG tablet, Take 1 tablet at onset of migraine as needed, may repeat in 2 hours if migraine persist, Disp: 12 tablet, Rfl: 11     Patient denies the need for medication refills today.    OB History          0    Para   0    Term   0       0    AB   0    Living   0         SAB   0    IAB   0    Ectopic   0    Molar   0    Multiple   0    Live Births   0                Health Maintenance   Topic Date Due    ANNUAL PHYSICAL  2023    COVID-19 Vaccine (3 - 2024-25 season) 2024    Annual Gynecologic Pelvic and Breast Exam  2025    INFLUENZA VACCINE  2025    PAP SMEAR  2027    TDAP/TD VACCINES (4 - Td or Tdap) 2035    Pneumococcal Vaccine 0-49  Aged Out    HEPATITIS C SCREENING  Discontinued       Past Medical History:   Diagnosis Date    Anxiety 2014    Cluster headache 2005    Common migraine     Depression     Esophageal reflux     Headache, tension-type         Past Surgical History:   Procedure Laterality Date    WISDOM TOOTH EXTRACTION         The additional  "following portions of the patient's history were reviewed and updated as appropriate: allergies, current medications, past family history, past medical history, past social history, past surgical history, and problem list.    Review of Systems   Constitutional: Negative.    HENT: Negative.     Eyes: Negative.    Respiratory: Negative.     Cardiovascular: Negative.    Gastrointestinal: Negative.    Endocrine: Negative.    Genitourinary: Negative.    Musculoskeletal: Negative.    Skin: Negative.    Allergic/Immunologic: Negative.    Neurological: Negative.    Hematological: Negative.    Psychiatric/Behavioral: Negative.           I have reviewed and agree with the HPI, ROS, and historical information as entered above. Mar Qureshi, MARQUEZ          Objective   /88   Ht 149.9 cm (59\")   Wt 112 kg (246 lb)   LMP 04/27/2025 (Exact Date)   BMI 49.69 kg/m²     Physical Exam  Vitals and nursing note reviewed. Exam conducted with a chaperone present.   Constitutional:       General: She is not in acute distress.     Appearance: Normal appearance. She is well-developed. She is not ill-appearing.   Neck:      Thyroid: No thyroid mass or thyromegaly.   Pulmonary:      Effort: Pulmonary effort is normal. No respiratory distress or retractions.   Chest:      Chest wall: No mass.   Breasts:     Right: Normal. No mass, nipple discharge, skin change or tenderness.      Left: Normal. No mass, nipple discharge, skin change or tenderness.   Abdominal:      General: There is no distension.      Palpations: Abdomen is soft. Abdomen is not rigid. There is no mass.      Tenderness: There is no abdominal tenderness. There is no guarding or rebound.      Hernia: No hernia is present.   Genitourinary:     General: Normal vulva.      Exam position: Lithotomy position.      Labia:         Right: No rash, tenderness or lesion.         Left: No rash, tenderness or lesion.       Vagina: Normal. No vaginal discharge, bleeding or lesions.      " Cervix: Normal. No cervical motion tenderness, discharge or cervical bleeding.      Uterus: Normal. Not enlarged, not fixed and not tender.       Adnexa: Right adnexa normal and left adnexa normal.        Right: No mass or tenderness.          Left: No mass or tenderness.        Rectum: Normal. No external hemorrhoid.   Musculoskeletal:      Cervical back: No muscular tenderness.   Skin:     General: Skin is warm and dry.   Neurological:      Mental Status: She is alert and oriented to person, place, and time.   Psychiatric:         Mood and Affect: Mood normal.         Behavior: Behavior normal.            Assessment and Plan    Problem List Items Addressed This Visit    None  Visit Diagnoses         Women's annual routine gynecological examination    -  Primary            GYN annual well woman exam.   Reviewed pap guidelines.   Discussed fertility. Recommend partner repeating semen analysis before they do any procedures or medications for her. They have been trying for long enough to be able to see DEBBIE without a referral. Gave pamphlet for [facility].   Recommend starting OPK's earlier in cycle, around CD8. Do them daily until she gets a peak result. Recommend having sex every other day until the peak, then having sex that day and the day after.   Reviewed monthly self breast exams.  Instructed to call with lumps, pain, or breast discharge.    Reviewed BMI and weight loss as preventative health measures.   Reviewed exercise as a preventative health measures.   RTC in 1 year or PRN with problems  Return in about 1 year (around 5/22/2026) for Annual physical.    Mar Qureshi, MARQUEZ  05/22/2025

## 2025-07-10 ENCOUNTER — SPECIALTY PHARMACY (OUTPATIENT)
Dept: NEUROLOGY | Facility: CLINIC | Age: 30
End: 2025-07-10
Payer: COMMERCIAL

## 2025-07-10 DIAGNOSIS — M26.609 TMJ (TEMPOROMANDIBULAR JOINT SYNDROME): ICD-10-CM

## 2025-07-10 RX ORDER — CYCLOBENZAPRINE HCL 5 MG
5 TABLET ORAL NIGHTLY PRN
Qty: 30 TABLET | Refills: 5 | Status: SHIPPED | OUTPATIENT
Start: 2025-07-10 | End: 2026-07-10

## 2025-07-10 NOTE — PROGRESS NOTES
Specialty Pharmacy Patient Management Program  Refill Outreach     Nena was contacted today regarding refills of their Nurtec medication(s).    Refill Questions      Flowsheet Row Most Recent Value   Changes to allergies? No   Changes to medications? No   New conditions or infections since last clinic visit No   Unplanned office visit, urgent care, ED, or hospital admission in the last 4 weeks  No   How does patient/caregiver feel medication is working? Very good   Financial problems or insurance changes  No   Since the previous refill, were any specialty medication doses or scheduled injections missed or delayed?  No   Does this patient require a clinical escalation to a pharmacist? No            Delivery Questions      Flowsheet Row Most Recent Value   Delivery method UPS   Delivery address verified with patient/caregiver? Yes   Delivery address Home   Other address preferred n/a   Number of medications in delivery 1   Medication(s) being filled and delivered Rimegepant Sulfate (NURTEC-ODT)   Doses left of specialty medications 5   Copay verified? Yes   Copay amount 0   Copay form of payment No copayment ($0)   Delivery Date Selection 07/10/25   Signature Required No   Do you consent to receive electronic handouts?  Yes                 Follow-up: 30 day(s)     Kandi Hunter, Pharmacy Technician  7/10/2025  10:21 EDT

## 2025-07-30 ENCOUNTER — SPECIALTY PHARMACY (OUTPATIENT)
Age: 30
End: 2025-07-30
Payer: COMMERCIAL

## 2025-07-30 NOTE — PROGRESS NOTES
Nurtec prior auth approved through pharmacy benefit.  Effective: 6/30/25 through 7/30/2026  Insurance: [facility]  Key: [id number]

## 2025-08-15 ENCOUNTER — SPECIALTY PHARMACY (OUTPATIENT)
Dept: GENERAL RADIOLOGY | Facility: HOSPITAL | Age: 30
End: 2025-08-15
Payer: COMMERCIAL